# Patient Record
Sex: FEMALE | Race: WHITE | NOT HISPANIC OR LATINO | Employment: FULL TIME | URBAN - METROPOLITAN AREA
[De-identification: names, ages, dates, MRNs, and addresses within clinical notes are randomized per-mention and may not be internally consistent; named-entity substitution may affect disease eponyms.]

---

## 2024-01-24 PROBLEM — Z01.419 WELL WOMAN EXAM: Status: ACTIVE | Noted: 2024-01-24

## 2024-01-24 NOTE — PROGRESS NOTES
32 y/o here for follow up- 2nd opinion on fibroids- was seen at [facility] for colposcopy in 11/2023 for abnormal pap smear done by PCP in 10/2023.  Biopsy attempted but unsuccessful due to position of fibroids.    Gynecologist suggested myomectomy vs hysterectomy vs uterine arterine mbolism.  Pt understands she is seeing cnm and will be rescheduled with gyn      Refer to Dr. Rupal Maguire, JOSE-ERENDIRA

## 2024-01-30 ENCOUNTER — OFFICE VISIT (OUTPATIENT)
Dept: OBSTETRICS AND GYNECOLOGY | Facility: CLINIC | Age: 34
End: 2024-01-30
Payer: COMMERCIAL

## 2024-01-30 VITALS
SYSTOLIC BLOOD PRESSURE: 124 MMHG | DIASTOLIC BLOOD PRESSURE: 80 MMHG | BODY MASS INDEX: 50.05 KG/M2 | WEIGHT: 272 LBS | HEIGHT: 62 IN

## 2024-01-30 DIAGNOSIS — D21.9 FIBROIDS: Primary | ICD-10-CM

## 2024-01-30 PROCEDURE — 1036F TOBACCO NON-USER: CPT | Performed by: MIDWIFE

## 2024-01-30 PROCEDURE — 99202 OFFICE O/P NEW SF 15 MIN: CPT | Performed by: MIDWIFE

## 2024-01-30 RX ORDER — CETIRIZINE HYDROCHLORIDE 10 MG/1
TABLET ORAL
COMMUNITY

## 2024-01-30 RX ORDER — ESCITALOPRAM OXALATE 10 MG/1
10 TABLET ORAL DAILY
COMMUNITY

## 2024-01-30 RX ORDER — NORETHINDRONE ACETATE AND ETHINYL ESTRADIOL, AND FERROUS FUMARATE 1.5-30(21)
1 KIT ORAL DAILY
COMMUNITY
End: 2024-05-10 | Stop reason: WASHOUT

## 2024-02-22 ENCOUNTER — OFFICE VISIT (OUTPATIENT)
Dept: OBSTETRICS AND GYNECOLOGY | Facility: CLINIC | Age: 34
End: 2024-02-22
Payer: COMMERCIAL

## 2024-02-22 VITALS
BODY MASS INDEX: 49.5 KG/M2 | HEIGHT: 62 IN | DIASTOLIC BLOOD PRESSURE: 88 MMHG | SYSTOLIC BLOOD PRESSURE: 145 MMHG | HEART RATE: 85 BPM | WEIGHT: 269 LBS

## 2024-02-22 DIAGNOSIS — D25.9 UTERINE LEIOMYOMA, UNSPECIFIED LOCATION: Primary | ICD-10-CM

## 2024-02-22 DIAGNOSIS — N93.9 ABNORMAL UTERINE BLEEDING (AUB): ICD-10-CM

## 2024-02-22 PROCEDURE — 1036F TOBACCO NON-USER: CPT | Performed by: STUDENT IN AN ORGANIZED HEALTH CARE EDUCATION/TRAINING PROGRAM

## 2024-02-22 PROCEDURE — 99215 OFFICE O/P EST HI 40 MIN: CPT | Performed by: STUDENT IN AN ORGANIZED HEALTH CARE EDUCATION/TRAINING PROGRAM

## 2024-02-22 ASSESSMENT — ENCOUNTER SYMPTOMS
RESPIRATORY NEGATIVE: 0
ALLERGIC/IMMUNOLOGIC NEGATIVE: 0
CONSTITUTIONAL NEGATIVE: 0
HEMATOLOGIC/LYMPHATIC NEGATIVE: 0
PSYCHIATRIC NEGATIVE: 0
MUSCULOSKELETAL NEGATIVE: 0
NEUROLOGICAL NEGATIVE: 0
CARDIOVASCULAR NEGATIVE: 0
EYES NEGATIVE: 0
ENDOCRINE NEGATIVE: 0
GASTROINTESTINAL NEGATIVE: 0

## 2024-02-22 ASSESSMENT — PAIN SCALES - GENERAL: PAINLEVEL: 0-NO PAIN

## 2024-02-22 NOTE — PROGRESS NOTES
"Division of Minimally Invasive Gynecologic Surgery  [facility]    02/22/24 Gynecology Consult     HISTORY OF PRESENT ILLNESS:  Khadijah Shaw 33 y.o. referred by ERENDIRA Maguire for AUB/fibroids.     She has no interest in fertility and has known that for several years.     Periods have always been heavy and painful. She also reports progressively worsening urinary urgency. She also has low back pain. She has had these symptoms for several years now.     Could not complete recent colposcopy due to cervical displacement from fibroids.     Imaging:   - Outside US 11/2023 showed uterus measuring 22cm x 11cm x 13 cm, multiple fibroids.   Labs: Recent CBC and TSH at OSH reviewed via patient's phone, WNL   Screening:   - Last pap 2023 negative/HPV other+, no hx of CIN2-3  PMHx: None   PSHx: foot surgery, arm surgery as a child     PAST MEDICAL HISTORY:  Past Medical History:   Diagnosis Date    Other specified health status     No pertinent past medical history       PAST SURGICAL HISTORY:  Past Surgical History:   Procedure Laterality Date    OTHER SURGICAL HISTORY  10/05/2020    Dermatological surgery       PHYSICAL EXAMINATION:  VITAL SIGNS: /88   Pulse 85   Ht 1.575 m (5' 2\")   Wt 122 kg (269 lb)   LMP 02/20/2024   BMI 49.20 kg/m²      Constitutional:  No acute distress, well-nourished and well-developed  HEENT: EOM grossly intact, MMM, neck supple and with full ROM  Pulm:  Effort normal. No accessory muscle usage.  No respiratory distress.  Neurological:  She is alert and oriented to person place and time.  Skin: Warm, no pallor.  Psychiatric:  She has normal mood and affect.    ASSESSMENT:  Khadijah Shaw 33 y.o. referred by ERENDIRA Maguire for AUB/fibroids.     We discussed the etiology and natural history of fibroids today, including the fact that their growth is estrogen-dependent and fibroids typically decrease in size after menopause. We reviewed treatment " options, including medical suppression of bleeding and surgical interventions, including UAE, myomectomy, and hysterectomy.     We discussed the standard procedure for laparoscopic hysterectomy, including contained morcellation. We reviewed the risks/benefits/alternatives to surgery. She is aware of the risk of bleeding, infection, and damage to nearby structures, including bladder, ureters, bowel, and vasculature. She is agreeable to blood transfusion if recommended. We discussed the risks/benefits of contained morcellation vs laparotomy, and she is comfortable with proceeding with contained morcellation. We reviewed the small risk of laparotomy and the fact that fertility following hysterectomy is not an option.     We reviewed the option of Lupron, both for menstrual suppression and for decrease in uterine volume prior to surgery. She is considering.       PLAN:  - Schedule for:   - Exam under anesthesia, colposcopy, operative hysteroscopy with endometrial sampling. PAT: No. Any location.    - Total laparoscopic hysterectomy, bilateral salpingectomy, any indicated procedure. PAT: Yes. Any location. Will defer scheduling until MRI is completed.   - MRI pelvis     Margaret Nunn MD  02/22/24  11:43 AM

## 2024-02-26 ENCOUNTER — PREP FOR PROCEDURE (OUTPATIENT)
Dept: OBSTETRICS AND GYNECOLOGY | Facility: HOSPITAL | Age: 34
End: 2024-02-26
Payer: COMMERCIAL

## 2024-02-26 DIAGNOSIS — N93.9 ABNORMAL UTERINE BLEEDING (AUB): Primary | ICD-10-CM

## 2024-02-29 DIAGNOSIS — N93.9 ABNORMAL UTERINE BLEEDING (AUB): ICD-10-CM

## 2024-03-04 DIAGNOSIS — Z01.818 PREOP EXAMINATION: Primary | ICD-10-CM

## 2024-03-04 PROBLEM — N93.9 ABNORMAL UTERINE BLEEDING (AUB): Status: ACTIVE | Noted: 2024-02-26

## 2024-03-11 ENCOUNTER — HOSPITAL ENCOUNTER (OUTPATIENT)
Dept: RADIOLOGY | Facility: CLINIC | Age: 34
Discharge: HOME | End: 2024-03-11
Payer: COMMERCIAL

## 2024-03-11 DIAGNOSIS — D25.9 UTERINE LEIOMYOMA, UNSPECIFIED LOCATION: ICD-10-CM

## 2024-03-11 PROCEDURE — A9575 INJ GADOTERATE MEGLUMI 0.1ML: HCPCS | Performed by: STUDENT IN AN ORGANIZED HEALTH CARE EDUCATION/TRAINING PROGRAM

## 2024-03-11 PROCEDURE — 2550000001 HC RX 255 CONTRASTS: Performed by: STUDENT IN AN ORGANIZED HEALTH CARE EDUCATION/TRAINING PROGRAM

## 2024-03-11 PROCEDURE — 72197 MRI PELVIS W/O & W/DYE: CPT

## 2024-03-11 PROCEDURE — 72197 MRI PELVIS W/O & W/DYE: CPT | Performed by: RADIOLOGY

## 2024-03-11 RX ORDER — GADOTERATE MEGLUMINE 376.9 MG/ML
20 INJECTION INTRAVENOUS
Status: COMPLETED | OUTPATIENT
Start: 2024-03-11 | End: 2024-03-11

## 2024-03-11 RX ADMIN — GADOTERATE MEGLUMINE 20 ML: 376.9 INJECTION INTRAVENOUS at 16:00

## 2024-03-15 ENCOUNTER — PRE-ADMISSION TESTING (OUTPATIENT)
Dept: PREADMISSION TESTING | Facility: HOSPITAL | Age: 34
End: 2024-03-15
Payer: COMMERCIAL

## 2024-03-15 VITALS
DIASTOLIC BLOOD PRESSURE: 84 MMHG | TEMPERATURE: 98.5 F | HEIGHT: 62 IN | SYSTOLIC BLOOD PRESSURE: 130 MMHG | WEIGHT: 272.05 LBS | HEART RATE: 70 BPM | OXYGEN SATURATION: 97 % | BODY MASS INDEX: 50.06 KG/M2

## 2024-03-15 DIAGNOSIS — N93.9 ABNORMAL UTERINE BLEEDING (AUB): ICD-10-CM

## 2024-03-15 DIAGNOSIS — Z01.818 PREOP EXAMINATION: ICD-10-CM

## 2024-03-15 LAB
ANION GAP SERPL CALC-SCNC: 15 MMOL/L (ref 10–20)
BUN SERPL-MCNC: 9 MG/DL (ref 6–23)
CALCIUM SERPL-MCNC: 9 MG/DL (ref 8.6–10.6)
CHLORIDE SERPL-SCNC: 103 MMOL/L (ref 98–107)
CO2 SERPL-SCNC: 23 MMOL/L (ref 21–32)
CREAT SERPL-MCNC: 0.64 MG/DL (ref 0.5–1.05)
EGFRCR SERPLBLD CKD-EPI 2021: >90 ML/MIN/1.73M*2
ERYTHROCYTE [DISTWIDTH] IN BLOOD BY AUTOMATED COUNT: 14 % (ref 11.5–14.5)
GLUCOSE SERPL-MCNC: 82 MG/DL (ref 74–99)
HCT VFR BLD AUTO: 40.2 % (ref 36–46)
HGB BLD-MCNC: 13 G/DL (ref 12–16)
MCH RBC QN AUTO: 28 PG (ref 26–34)
MCHC RBC AUTO-ENTMCNC: 32.3 G/DL (ref 32–36)
MCV RBC AUTO: 87 FL (ref 80–100)
NRBC BLD-RTO: 0 /100 WBCS (ref 0–0)
PLATELET # BLD AUTO: 277 X10*3/UL (ref 150–450)
POTASSIUM SERPL-SCNC: 4.3 MMOL/L (ref 3.5–5.3)
RBC # BLD AUTO: 4.65 X10*6/UL (ref 4–5.2)
SODIUM SERPL-SCNC: 137 MMOL/L (ref 136–145)
WBC # BLD AUTO: 7.8 X10*3/UL (ref 4.4–11.3)

## 2024-03-15 PROCEDURE — 80048 BASIC METABOLIC PNL TOTAL CA: CPT

## 2024-03-15 PROCEDURE — 99204 OFFICE O/P NEW MOD 45 MIN: CPT | Performed by: NURSE PRACTITIONER

## 2024-03-15 PROCEDURE — 36415 COLL VENOUS BLD VENIPUNCTURE: CPT

## 2024-03-15 PROCEDURE — 85027 COMPLETE CBC AUTOMATED: CPT

## 2024-03-15 ASSESSMENT — ENCOUNTER SYMPTOMS
MUSCULOSKELETAL NEGATIVE: 1
EYES NEGATIVE: 1
ENDOCRINE NEGATIVE: 1
NECK NEGATIVE: 1
CARDIOVASCULAR NEGATIVE: 1
NEUROLOGICAL NEGATIVE: 1
GASTROINTESTINAL NEGATIVE: 1
RESPIRATORY NEGATIVE: 1
CONSTITUTIONAL NEGATIVE: 1

## 2024-03-15 ASSESSMENT — DUKE ACTIVITY SCORE INDEX (DASI)
DASI METS SCORE: 9.9
CAN YOU DO HEAVY WORK AROUND THE HOUSE LIKE SCRUBBING FLOORS OR LIFTING AND MOVING HEAVY FURNITURE: YES
CAN YOU DO YARD WORK LIKE RAKING LEAVES, WEEDING OR PUSHING A MOWER: YES
CAN YOU PARTICIPATE IN STRENOUS SPORTS LIKE SWIMMING, SINGLES TENNIS, FOOTBALL, BASKETBALL, OR SKIING: YES
CAN YOU TAKE CARE OF YOURSELF (EAT, DRESS, BATHE, OR USE TOILET): YES
CAN YOU DO LIGHT WORK AROUND THE HOUSE LIKE DUSTING OR WASHING DISHES: YES
CAN YOU WALK INDOORS, SUCH AS AROUND YOUR HOUSE: YES
CAN YOU WALK A BLOCK OR TWO ON LEVEL GROUND: YES
CAN YOU CLIMB A FLIGHT OF STAIRS OR WALK UP A HILL: YES
CAN YOU HAVE SEXUAL RELATIONS: YES
CAN YOU PARTICIPATE IN MODERATE RECREATIONAL ACTIVITIES LIKE GOLF, BOWLING, DANCING, DOUBLES TENNIS OR THROWING A BASEBALL OR FOOTBALL: YES
CAN YOU DO MODERATE WORK AROUND THE HOUSE LIKE VACUUMING, SWEEPING FLOORS OR CARRYING GROCERIES: YES
CAN YOU RUN A SHORT DISTANCE: YES
TOTAL_SCORE: 58.2

## 2024-03-15 ASSESSMENT — CHADS2 SCORE
PRIOR STROKE OR TIA OR THROMBOEMBOLISM: NO
CHF: NO
CHADS2 SCORE: 0
AGE GREATER THAN OR EQUAL TO 75: NO
HYPERTENSION: NO
DIABETES: NO

## 2024-03-15 ASSESSMENT — LIFESTYLE VARIABLES: SMOKING_STATUS: NONSMOKER

## 2024-03-15 NOTE — CPM/PAT H&P
"CPM/PAT Evaluation       Name: Khadijah Shaw (Khadijah Shaw \"Yessica\")  /Age: 1990/ y.o.     Visit Type:   In-Person       Chief Complaint: AUB/fibroids    HPI  The patient is a 33 year old female with complaints of menorrhagia, fibroids. She notes low back pain and worsening urinary urgency. She had recent ultrasound 2023 with uterus measuring 22cm x 11cm x 13 cm, multiple fibroids.  She presents today for perioperative evaluation in anticipation of Exam under anesthesia, colposcopy, operative hysteroscopy with endometrial sampling on 3/26/24 with Dr. Nunn.    Past Medical History:   Diagnosis Date    Anxiety     BMI 45.0-49.9, adult (CMS/[facility])     Depression     Dysfunctional uterine bleeding     Fractures     age 3    GERD (gastroesophageal reflux disease)     Leiomyoma     uterine    Rosacea     Uterine leiomyoma     Vitamin D deficiency     Vitamin D deficiency        Past Surgical History:   Procedure Laterality Date    FOOT SURGERY      wart removal    FRACTURE SURGERY      arm as child       Patient  has no history on file for sexual activity.    Family History   Problem Relation Name Age of Onset    Hypertension Mother      Breast cancer Mother      Thyroid cancer Mother      Hypertension Father      Skin cancer Father      Hypertension Brother         Allergies   Allergen Reactions    Animal Dander Runny nose     sneezing    Bee Pollen Hives       Prior to Admission medications    Medication Sig Start Date End Date Taking? Authorizing Provider   cetirizine (ZyrTEC) 10 mg tablet Take by mouth.    Historical Provider, MD   escitalopram (Lexapro) 10 mg tablet Take 1 tablet (10 mg) by mouth once daily.    Historical Provider, MD   Junel FE 1., 28, 1.5 mg-30 mcg (21)/75 mg (7) tablet Take 1 tablet by mouth once daily.    Historical Provider, MD   leuprolide, 3-month, (Lupron Depot) 11.25 mg injection Inject 11.25 mg into the muscle every 3 months. 3/4/24 3/4/25  Margaret WEBB" MD Edouard        PAT ROS:   Constitutional:   neg    Neuro/Psych:   neg    Eyes:   neg    Ears:   neg    Nose:   neg    Mouth:   neg    Throat:   neg    Neck:   neg    Cardio:   neg    Respiratory:   neg    Endocrine:   neg    GI:   neg    :   neg    Musculoskeletal:   neg    Hematologic:   neg    Skin:  neg        Physical Exam  Vitals reviewed.   Constitutional:       Appearance: Normal appearance. She is obese.   HENT:      Head: Normocephalic and atraumatic.      Nose: Nose normal.      Mouth/Throat:      Mouth: Mucous membranes are moist.      Pharynx: Oropharynx is clear.   Eyes:      Extraocular Movements: Extraocular movements intact.      Conjunctiva/sclera: Conjunctivae normal.      Pupils: Pupils are equal, round, and reactive to light.   Cardiovascular:      Rate and Rhythm: Normal rate and regular rhythm.      Pulses: Normal pulses.      Heart sounds: Normal heart sounds.   Pulmonary:      Effort: Pulmonary effort is normal.      Breath sounds: Normal breath sounds.   Musculoskeletal:         General: Normal range of motion.      Cervical back: Normal range of motion.   Skin:     General: Skin is warm and dry.   Neurological:      General: No focal deficit present.      Mental Status: She is alert and oriented to person, place, and time.   Psychiatric:         Mood and Affect: Mood normal.         Behavior: Behavior normal.          PAT AIRWAY:   Airway:     Mallampati::  IV    TM distance::  >3 FB    Neck ROM::  Full  normal        Visit Vitals  /84   Pulse 70   Temp 36.9 °C (98.5 °F) (Oral)       DASI Risk Score      Flowsheet Row Most Recent Value   DASI SCORE 58.2   METS Score (Will be calculated only when all the questions are answered) 9.9          Caprini DVT Assessment      Flowsheet Row Most Recent Value   DVT Score 5   Current Status Minor surgery planned   Women Oral contraceptives   Age Less than 40 years   BMI 41-50 (Morbid obesity)          Modified Frailty Index      Flowsheet  Row Most Recent Value   Modified Frailty Index Calculator 0          CHADS2 Stroke Risk  Current as of just now        N/A 3 - 100%: High Risk   2 - 3%: Medium Risk   0 - 2%: Low Risk     Last Change: N/A          This score determines the patient's risk of having a stroke if the patient has atrial fibrillation.        This score is not applicable to this patient. Components are not calculated.          Revised Cardiac Risk Index      Flowsheet Row Most Recent Value   Revised Cardiac Risk Calculator 0          Apfel Simplified Score      Flowsheet Row Most Recent Value   Apfel Simplified Score Calculator 3          Risk Analysis Index Results This Encounter    No data found in the last 1 encounters.       Stop Bang Score      Flowsheet Row Most Recent Value   Do you snore loudly? 0   Do you often feel tired or fatigued after your sleep? 0   Has anyone ever observed you stop breathing in your sleep? 0   Do you have or are you being treated for high blood pressure? 0   Recent BMI (Calculated) 49.2   Is BMI greater than 35 kg/m2? 1=Yes   Age older than 50 years old? 0=No   Is your neck circumference greater than 17 inches (Male) or 16 inches (Female)? 0   Gender - Male 0=No   STOP-BANG Total Score 1          Recent Results (from the past 336 hour(s))   CBC    Collection Time: 03/15/24  8:08 AM   Result Value Ref Range    WBC 7.8 4.4 - 11.3 x10*3/uL    nRBC 0.0 0.0 - 0.0 /100 WBCs    RBC 4.65 4.00 - 5.20 x10*6/uL    Hemoglobin 13.0 12.0 - 16.0 g/dL    Hematocrit 40.2 36.0 - 46.0 %    MCV 87 80 - 100 fL    MCH 28.0 26.0 - 34.0 pg    MCHC 32.3 32.0 - 36.0 g/dL    RDW 14.0 11.5 - 14.5 %    Platelets 277 150 - 450 x10*3/uL   Basic Metabolic Panel    Collection Time: 03/15/24  8:08 AM   Result Value Ref Range    Glucose 82 74 - 99 mg/dL    Sodium 137 136 - 145 mmol/L    Potassium 4.3 3.5 - 5.3 mmol/L    Chloride 103 98 - 107 mmol/L    Bicarbonate 23 21 - 32 mmol/L    Anion Gap 15 10 - 20 mmol/L    Urea Nitrogen 9 6 - 23  mg/dL    Creatinine 0.64 0.50 - 1.05 mg/dL    eGFR >90 >60 mL/min/1.73m*2    Calcium 9.0 8.6 - 10.6 mg/dL        Diagnostic Results        Assessment and Plan:     Anesthesia:  The patient denies problems with anesthesia in the past such as PONV, prolonged sedation, awareness, dental damage, aspiration, cardiac arrest, difficult intubation, or unexpected hospital admissions.     Neuro:   The patient has history of anxiety and depression controlled on oral medication. No grossly apparent perioperative risk. The patient is at increased risk for perioperative stroke secondary to female gender. Handouts for preoperative brain exercises given to patient.    HEENT/Airway  The patient has diagnoses, significant findings on chart review, clinical presentation or evaluation of obesity, short thick neck. No documented or reported history of airway difficulty.     Cardiovascular  The patient is scheduled for a low risk procedure.  Therefore, no further cardiac evaluation is indicated.  RCRI  The patient meets 0-1 RCRI criteria and therefore has a less than 1% risk of major adverse cardiac complications.  METS  The patient's functional capacity capacity is greater than 4 METS.  Heart Failure  The patient has no known history of heart failure.  Additionally, the patient reports no symptoms of heart failure and demonstrates no signs of heart failure.  Hypertension Evaluation  The patient has no known history of hypertension and has a normal blood pressure today.  Heart Rhythm Evaluation  The patient has no history of arrhythmias.  Heart Valve Evaluation  The patient has no known history of valvular heart disease. The patient has no symptoms or physical exam findings to suggest valvular heart disease.  CARDS EVAL  The patient is not followed by cardiology.    The patient has a 30-day risk for MACE of 0 predictors, 3.9% risk for cardiac death, nonfatal myocardial infarction, and nonfatal cardiac arrest.  FILEMON score which indicates  a 0% risk of intraoperative or 30-day postoperative.    Pulmonary   No significant findings on chart review or clinical presentation and evaluation. The patient is at increased risk of perioperative pulmonary complications secondary to morbid obesity.    The patient has a stop bang score of 1, which places patient at low risk for having WES.    ARISCAT 0, low, 1.6% risk of in-hospital postoperative pulmonary complications  PRODIGY 3, low risk of respiratory depression episode. Patient given PI sheet for preoperative deep breathing exercises.    Hematology  No diagnoses or significant findings on chart review or clinical presentation and evaluation.  Antiplatelet management   The patient is not currently receiving antiplatelet therapy.  Anticoagulation management  The patient is not currently receiving anticoagulation therapy. Patient provided with DVT educational handout.      Caprini score 5, high risk of perioperative VTE.   Patient instructed to ambulate as soon as possible postoperatively to decrease thromboembolic risk. Initiate mechanical DVT prophylaxis as soon as possible and initiate chemical prophylaxis when deemed safe from a bleeding standpoint post surgery.     Gastrointestinal  The patient has diagnoses or significant findings on chart review or clinical presentation and evaluation significant for GERD managed on OTC.  Eat 10- 0,  self-perceived oropharyngeal dysphagia scale (0-40)     Genitourinary  No diagnoses or significant findings on chart review or clinical presentation and evaluation.    Renal  The patient has no known history of chronic kidney disease. No renal diagnoses or significant findings on chart review or clinical presentation and evaluation..     Musculoskeletal  No diagnoses or significant findings on chart review or clinical presentation and evaluation.    Endocrine  Diabetes Evaluation  The patient has no history of diabetes mellitus.  Thyroid Disease Evaluation  The patient has no  history of thyroid disease.    ID  No diagnoses or significant findings on chart review or clinical presentation and evaluation.    -Preoperative medication instructions were provided and reviewed with the patient.  Any additional testing or evaluation was explained to the patient.  NPO Instructions were discussed, and the patient's questions were answered prior to conclusion of this encounter.

## 2024-03-15 NOTE — PREPROCEDURE INSTRUCTIONS
Fasting Guidelines    NPO Instructions:    Do not eat any food after midnight the night before your surgery/procedure.  You may have up to TEN ounces of clear liquids until TWO hours before your instructed arrival time to the hospital. This includes water, black tea/coffee, (no milk or cream), apple juice, and/or electrolyte drinks (Gatorade).  You may chew gum up to TWO hours before your surgery/procedure.    Additional Instructions:    Avoid herbal supplements, multivitamins and NSAIDS (non-steroidal anti-inflammatory drugs) such as Advil, Aleve, Ibuprofen, Naproxen, Excedrin, Meloxicam or Celebrex for at least 7 days prior to surgery. May take Tylenol as needed.    Avoid tobacco and alcohol products for 24 hours prior to surgery.    CONTACT SURGEON'S OFFICE IF YOU DEVELOP:  * Fever = 100.4 F   * New respiratory symptoms (e.g. cough, shortness of breath, respiratory distress, sore throat)  * Recent loss of taste or smell  *Flu like symptoms such as headache, fatigue or gastrointestinal symptoms  * You develop any open sores, shingles, burning or painful urination   AND/OR:  * You no longer wish to have the surgery.  * Any other personal circumstances change that may lead to the need to cancel or defer this surgery.  *You were admitted to any hospital within one week of your planned procedure.    Seven/Six Days before Surgery:  Review your medication instructions, stop indicated medications    Day of Surgery:  Review your medication instructions, take indicated medications  Wear comfortable loose fitting clothing  Do not use moisturizers, creams, lotions or perfume  All jewelry and valuables should be left at home    Krish Zapata [facility]  [facility]  Mvwpd-[phone]  [phone]  Email-[email or web address]    [facility]  [phone]       Preoperative Brain Exercises    What are brain exercises?  A brain exercise is any activity that  engages your thinking (cognitive) skills.    What types of activities are considered brain exercises?  Jigsaw puzzles, crossword puzzles, word jumble, memory games, word search, and many more.  Many can be found free online or on your phone via a mobile tee.    Why should I do brain exercises before my surgery?  More recent research has shown brain exercise before surgery can lower the risk of postoperative delirium (confusion) which can be especially important for older adults.  Patients who did brain exercises for 5 to 10 hours the days before surgery, cut their risk of postoperative delirium in half up to 1 week after surgery.         [facility]    Preoperative Deep Breathing Exercises    Why it is important to do deep breathing exercises before my surgery?  Deep breathing exercises strengthen your breathing muscles.  This helps you to recover after your surgery and decreases the chance of breathing complications.      How are the deep breathing exercises done?  Sit straight with your back supported.  Breathe in deeply and slowly through your nose. Your lower rib cage should expand and your abdomen may move forward.  Hold that breath for 3 to 5 seconds.  Breathe out through pursed lips, slowly and completely.  Rest and repeat 10 times every hour while awake.  Rest longer if you become dizzy or lightheaded.         Patient and Family Education             Ways You Can Help Prevent Blood Clots             This handout explains some simple things you can do to help prevent blood clots.      Blood clots are blockages that can form in the body's veins. When a blood clot forms in your deep veins, it may be called a deep vein thrombosis, or DVT for short. Blood clots can happen in any part of the body where blood flows, but they are most common in the arms and legs. If a piece of a blood clot breaks free and travels to the lungs, it is called a pulmonary embolus (PE). A PE can be a very  serious problem.         Being in the hospital or having surgery can raise your chances of getting a blood clot because you may not be well enough to move around as much as you normally do.         Ways you can help prevent blood clots in the hospital         Wearing SCDs. SCDs stands for Sequential Compression Devices.   SCDs are special sleeves that wrap around your legs  They attach to a pump that fills them with air to gently squeeze your legs every few minutes.   This helps return the blood in your legs to your heart.   SCDs should only be taken off when walking or bathing.   SCDs may not be comfortable, but they can help save your life.               Wearing compression stockings - if your doctor orders them. These special snug fitting stockings gently squeeze your legs to help blood flow.       Walking. Walking helps move the blood in your legs.   If your doctor says it is ok, try walking the halls at least   5 times a day. Ask us to help you get up, so you don't fall.      Taking any blood thinning medicines your doctor orders.        Page 1 of 2     [facility]; 3/23   Ways you can help prevent blood clots at home       Wearing compression stockings - if your doctor orders them. ? Walking - to help move the blood in your legs.       Taking any blood thinning medicines your doctor orders.      Signs of a blood clot or PE      Tell your doctor or nurse know right away if you have of the problems listed below.    If you are at home, seek medical care right away. Call 911 for chest pain or problems breathing.               Signs of a blood clot (DVT) - such as pain,  swelling, redness or warmth in your arm or leg      Signs of a pulmonary embolism (PE) - such as chest     pain or feeling short of breath

## 2024-03-20 DIAGNOSIS — D21.9 FIBROIDS: Primary | ICD-10-CM

## 2024-03-20 RX ORDER — LEUPROLIDE ACETATE 11.25 MG
11.25 KIT INTRAMUSCULAR
Qty: 1 KIT | Refills: 1 | Status: SHIPPED | OUTPATIENT
Start: 2024-03-20 | End: 2024-05-01 | Stop reason: WASHOUT

## 2024-03-22 ENCOUNTER — TELEPHONE (OUTPATIENT)
Dept: OBSTETRICS AND GYNECOLOGY | Facility: CLINIC | Age: 34
End: 2024-03-22
Payer: COMMERCIAL

## 2024-03-22 ENCOUNTER — SPECIALTY PHARMACY (OUTPATIENT)
Dept: PHARMACY | Facility: CLINIC | Age: 34
End: 2024-03-22

## 2024-03-25 ENCOUNTER — ANESTHESIA EVENT (OUTPATIENT)
Dept: OPERATING ROOM | Facility: HOSPITAL | Age: 34
End: 2024-03-25
Payer: COMMERCIAL

## 2024-03-25 NOTE — H&P
History Of Present Illness  Yessica Shaw is a 33 y.o. female presenting with AUB who presents for exam under anesthesia, colposcopy, diagnostic hysteroscopy, endometrial sampling.      Periods have always been heavy and painful. She also reports progressively worsening urinary urgency. She also has low back pain. She has had these symptoms for several years now.      Could not complete recent colposcopy due to cervical displacement from fibroids.      Imaging:   - Outside US 11/2023 showed uterus measuring 22cm x 11cm x 13 cm, multiple fibroids.   Screening:   - Last pap 2023 negative/HPV other+, no hx of CIN2-3  PMHx: None   PSHx: foot surgery, arm surgery as a child      Past Medical History  Past Medical History:   Diagnosis Date    Anxiety     BMI 45.0-49.9, adult (CMS/[facility])     Depression     Dysfunctional uterine bleeding     Fractures     age 3    GERD (gastroesophageal reflux disease)     Leiomyoma     uterine    Rosacea     Uterine leiomyoma     Vitamin D deficiency     Vitamin D deficiency        Surgical History  Past Surgical History:   Procedure Laterality Date    FOOT SURGERY      wart removal    FRACTURE SURGERY      arm as child        Social History  She reports that she has never smoked. She has never used smokeless tobacco. She reports current alcohol use. She reports that she does not use drugs.    Family History  Family History   Problem Relation Name Age of Onset    Hypertension Mother      Breast cancer Mother      Thyroid cancer Mother      Hypertension Father      Skin cancer Father      Hypertension Brother          Allergies  Animal dander and Bee pollen    Review of Systems   All other systems reviewed and are negative.       Physical Exam  Constitutional:       General: She is not in acute distress.     Appearance: Normal appearance.   HENT:      Head: Normocephalic and atraumatic.      Nose: Nose normal.   Eyes:      General: No scleral icterus.  Cardiovascular:      Rate and  Rhythm: Normal rate.   Pulmonary:      Effort: Pulmonary effort is normal.   Abdominal:      Palpations: Abdomen is soft.   Musculoskeletal:         General: Normal range of motion.      Cervical back: Normal range of motion.   Skin:     General: Skin is warm and dry.   Neurological:      General: No focal deficit present.      Mental Status: She is alert and oriented to person, place, and time.   Psychiatric:         Mood and Affect: Mood normal.         Behavior: Behavior normal.          Last Recorded Vitals  There were no vitals taken for this visit.       Assessment/Plan   Principal Problem:    Abnormal uterine bleeding (AUB)    Yessica Shaw is a 33 y.o. female presenting with AUB who presents for exam under anesthesia, colposcopy, diagnostic hysteroscopy, endometrial sampling.     Discussed and seen with Dr. Edouard Wooten MD    Interval update: Pt seen and consented. Proceed w/ surgery as scheduled.

## 2024-03-26 ENCOUNTER — HOSPITAL ENCOUNTER (OUTPATIENT)
Facility: HOSPITAL | Age: 34
Setting detail: OUTPATIENT SURGERY
Discharge: HOME | End: 2024-03-26
Attending: STUDENT IN AN ORGANIZED HEALTH CARE EDUCATION/TRAINING PROGRAM | Admitting: STUDENT IN AN ORGANIZED HEALTH CARE EDUCATION/TRAINING PROGRAM
Payer: COMMERCIAL

## 2024-03-26 ENCOUNTER — ANESTHESIA (OUTPATIENT)
Dept: OPERATING ROOM | Facility: HOSPITAL | Age: 34
End: 2024-03-26
Payer: COMMERCIAL

## 2024-03-26 VITALS
RESPIRATION RATE: 17 BRPM | TEMPERATURE: 98.1 F | SYSTOLIC BLOOD PRESSURE: 124 MMHG | DIASTOLIC BLOOD PRESSURE: 73 MMHG | WEIGHT: 272.27 LBS | BODY MASS INDEX: 51.4 KG/M2 | HEIGHT: 61 IN | HEART RATE: 85 BPM | OXYGEN SATURATION: 95 %

## 2024-03-26 DIAGNOSIS — N93.9 ABNORMAL UTERINE BLEEDING (AUB): Primary | ICD-10-CM

## 2024-03-26 LAB — PREGNANCY TEST URINE, POC: NEGATIVE

## 2024-03-26 PROCEDURE — 3600000008 HC OR TIME - EACH INCREMENTAL 1 MINUTE - PROCEDURE LEVEL THREE: Performed by: STUDENT IN AN ORGANIZED HEALTH CARE EDUCATION/TRAINING PROGRAM

## 2024-03-26 PROCEDURE — 88305 TISSUE EXAM BY PATHOLOGIST: CPT | Mod: TC,SUR | Performed by: STUDENT IN AN ORGANIZED HEALTH CARE EDUCATION/TRAINING PROGRAM

## 2024-03-26 PROCEDURE — 3700000002 HC GENERAL ANESTHESIA TIME - EACH INCREMENTAL 1 MINUTE: Performed by: STUDENT IN AN ORGANIZED HEALTH CARE EDUCATION/TRAINING PROGRAM

## 2024-03-26 PROCEDURE — 2720000007 HC OR 272 NO HCPCS: Performed by: STUDENT IN AN ORGANIZED HEALTH CARE EDUCATION/TRAINING PROGRAM

## 2024-03-26 PROCEDURE — 7100000009 HC PHASE TWO TIME - INITIAL BASE CHARGE: Performed by: STUDENT IN AN ORGANIZED HEALTH CARE EDUCATION/TRAINING PROGRAM

## 2024-03-26 PROCEDURE — 7100000002 HC RECOVERY ROOM TIME - EACH INCREMENTAL 1 MINUTE: Performed by: STUDENT IN AN ORGANIZED HEALTH CARE EDUCATION/TRAINING PROGRAM

## 2024-03-26 PROCEDURE — 2500000001 HC RX 250 WO HCPCS SELF ADMINISTERED DRUGS (ALT 637 FOR MEDICARE OP): Performed by: STUDENT IN AN ORGANIZED HEALTH CARE EDUCATION/TRAINING PROGRAM

## 2024-03-26 PROCEDURE — 2500000004 HC RX 250 GENERAL PHARMACY W/ HCPCS (ALT 636 FOR OP/ED): Performed by: STUDENT IN AN ORGANIZED HEALTH CARE EDUCATION/TRAINING PROGRAM

## 2024-03-26 PROCEDURE — 58558 HYSTEROSCOPY BIOPSY: CPT | Performed by: STUDENT IN AN ORGANIZED HEALTH CARE EDUCATION/TRAINING PROGRAM

## 2024-03-26 PROCEDURE — 2500000005 HC RX 250 GENERAL PHARMACY W/O HCPCS

## 2024-03-26 PROCEDURE — 3600000003 HC OR TIME - INITIAL BASE CHARGE - PROCEDURE LEVEL THREE: Performed by: STUDENT IN AN ORGANIZED HEALTH CARE EDUCATION/TRAINING PROGRAM

## 2024-03-26 PROCEDURE — 2500000004 HC RX 250 GENERAL PHARMACY W/ HCPCS (ALT 636 FOR OP/ED): Performed by: ANESTHESIOLOGIST ASSISTANT

## 2024-03-26 PROCEDURE — 3700000001 HC GENERAL ANESTHESIA TIME - INITIAL BASE CHARGE: Performed by: STUDENT IN AN ORGANIZED HEALTH CARE EDUCATION/TRAINING PROGRAM

## 2024-03-26 PROCEDURE — 2500000005 HC RX 250 GENERAL PHARMACY W/O HCPCS: Performed by: ANESTHESIOLOGIST ASSISTANT

## 2024-03-26 PROCEDURE — A58558 PR HYSTEROSCOPY,W/ENDO BX: Performed by: STUDENT IN AN ORGANIZED HEALTH CARE EDUCATION/TRAINING PROGRAM

## 2024-03-26 PROCEDURE — 7100000001 HC RECOVERY ROOM TIME - INITIAL BASE CHARGE: Performed by: STUDENT IN AN ORGANIZED HEALTH CARE EDUCATION/TRAINING PROGRAM

## 2024-03-26 PROCEDURE — 2500000005 HC RX 250 GENERAL PHARMACY W/O HCPCS: Performed by: STUDENT IN AN ORGANIZED HEALTH CARE EDUCATION/TRAINING PROGRAM

## 2024-03-26 PROCEDURE — 2500000004 HC RX 250 GENERAL PHARMACY W/ HCPCS (ALT 636 FOR OP/ED)

## 2024-03-26 PROCEDURE — A4217 STERILE WATER/SALINE, 500 ML: HCPCS | Performed by: STUDENT IN AN ORGANIZED HEALTH CARE EDUCATION/TRAINING PROGRAM

## 2024-03-26 PROCEDURE — 7100000010 HC PHASE TWO TIME - EACH INCREMENTAL 1 MINUTE: Performed by: STUDENT IN AN ORGANIZED HEALTH CARE EDUCATION/TRAINING PROGRAM

## 2024-03-26 PROCEDURE — 81025 URINE PREGNANCY TEST: CPT | Performed by: ANESTHESIOLOGY

## 2024-03-26 PROCEDURE — 88305 TISSUE EXAM BY PATHOLOGIST: CPT | Performed by: STUDENT IN AN ORGANIZED HEALTH CARE EDUCATION/TRAINING PROGRAM

## 2024-03-26 RX ORDER — LABETALOL HYDROCHLORIDE 5 MG/ML
5 INJECTION, SOLUTION INTRAVENOUS ONCE AS NEEDED
Status: DISCONTINUED | OUTPATIENT
Start: 2024-03-26 | End: 2024-03-26 | Stop reason: HOSPADM

## 2024-03-26 RX ORDER — SODIUM CHLORIDE, SODIUM LACTATE, POTASSIUM CHLORIDE, CALCIUM CHLORIDE 600; 310; 30; 20 MG/100ML; MG/100ML; MG/100ML; MG/100ML
INJECTION, SOLUTION INTRAVENOUS CONTINUOUS PRN
Status: DISCONTINUED | OUTPATIENT
Start: 2024-03-26 | End: 2024-03-26

## 2024-03-26 RX ORDER — ONDANSETRON 4 MG/1
4 TABLET, FILM COATED ORAL EVERY 6 HOURS PRN
Qty: 20 TABLET | Refills: 0 | Status: SHIPPED | OUTPATIENT
Start: 2024-03-26 | End: 2024-04-02

## 2024-03-26 RX ORDER — GLYCOPYRROLATE 0.2 MG/ML
INJECTION INTRAMUSCULAR; INTRAVENOUS AS NEEDED
Status: DISCONTINUED | OUTPATIENT
Start: 2024-03-26 | End: 2024-03-26

## 2024-03-26 RX ORDER — PROPOFOL 10 MG/ML
INJECTION, EMULSION INTRAVENOUS AS NEEDED
Status: DISCONTINUED | OUTPATIENT
Start: 2024-03-26 | End: 2024-03-26

## 2024-03-26 RX ORDER — SODIUM CHLORIDE 0.9 G/100ML
IRRIGANT IRRIGATION AS NEEDED
Status: DISCONTINUED | OUTPATIENT
Start: 2024-03-26 | End: 2024-03-26 | Stop reason: HOSPADM

## 2024-03-26 RX ORDER — FENTANYL CITRATE 50 UG/ML
INJECTION, SOLUTION INTRAMUSCULAR; INTRAVENOUS AS NEEDED
Status: DISCONTINUED | OUTPATIENT
Start: 2024-03-26 | End: 2024-03-26

## 2024-03-26 RX ORDER — SODIUM CHLORIDE, SODIUM LACTATE, POTASSIUM CHLORIDE, CALCIUM CHLORIDE 600; 310; 30; 20 MG/100ML; MG/100ML; MG/100ML; MG/100ML
100 INJECTION, SOLUTION INTRAVENOUS CONTINUOUS
Status: DISCONTINUED | OUTPATIENT
Start: 2024-03-26 | End: 2024-03-26 | Stop reason: HOSPADM

## 2024-03-26 RX ORDER — ACETIC ACID 5 %
LIQUID (ML) MISCELLANEOUS CONTINUOUS PRN
Status: COMPLETED | OUTPATIENT
Start: 2024-03-26 | End: 2024-03-26

## 2024-03-26 RX ORDER — HYDROMORPHONE HYDROCHLORIDE 1 MG/ML
0.5 INJECTION, SOLUTION INTRAMUSCULAR; INTRAVENOUS; SUBCUTANEOUS EVERY 5 MIN PRN
Status: DISCONTINUED | OUTPATIENT
Start: 2024-03-26 | End: 2024-03-26 | Stop reason: HOSPADM

## 2024-03-26 RX ORDER — HYDROMORPHONE HYDROCHLORIDE 1 MG/ML
0.2 INJECTION, SOLUTION INTRAMUSCULAR; INTRAVENOUS; SUBCUTANEOUS EVERY 5 MIN PRN
Status: DISCONTINUED | OUTPATIENT
Start: 2024-03-26 | End: 2024-03-26 | Stop reason: HOSPADM

## 2024-03-26 RX ORDER — KETOROLAC TROMETHAMINE 30 MG/ML
INJECTION, SOLUTION INTRAMUSCULAR; INTRAVENOUS AS NEEDED
Status: DISCONTINUED | OUTPATIENT
Start: 2024-03-26 | End: 2024-03-26

## 2024-03-26 RX ORDER — ONDANSETRON HYDROCHLORIDE 2 MG/ML
4 INJECTION, SOLUTION INTRAVENOUS ONCE AS NEEDED
Status: COMPLETED | OUTPATIENT
Start: 2024-03-26 | End: 2024-03-26

## 2024-03-26 RX ORDER — ACETAMINOPHEN 325 MG/1
650 TABLET ORAL EVERY 4 HOURS PRN
Status: DISCONTINUED | OUTPATIENT
Start: 2024-03-26 | End: 2024-03-26 | Stop reason: HOSPADM

## 2024-03-26 RX ORDER — DROPERIDOL 2.5 MG/ML
0.62 INJECTION, SOLUTION INTRAMUSCULAR; INTRAVENOUS ONCE AS NEEDED
Status: DISCONTINUED | OUTPATIENT
Start: 2024-03-26 | End: 2024-03-26 | Stop reason: HOSPADM

## 2024-03-26 RX ORDER — ONDANSETRON HYDROCHLORIDE 2 MG/ML
INJECTION, SOLUTION INTRAVENOUS AS NEEDED
Status: DISCONTINUED | OUTPATIENT
Start: 2024-03-26 | End: 2024-03-26

## 2024-03-26 RX ORDER — IBUPROFEN 600 MG/1
600 TABLET ORAL EVERY 6 HOURS PRN
Qty: 20 TABLET | Refills: 0 | Status: SHIPPED | OUTPATIENT
Start: 2024-03-26 | End: 2024-04-05

## 2024-03-26 RX ORDER — LIDOCAINE HYDROCHLORIDE 10 MG/ML
0.1 INJECTION, SOLUTION EPIDURAL; INFILTRATION; INTRACAUDAL; PERINEURAL ONCE
Status: DISCONTINUED | OUTPATIENT
Start: 2024-03-26 | End: 2024-03-26 | Stop reason: HOSPADM

## 2024-03-26 RX ORDER — MIDAZOLAM HYDROCHLORIDE 1 MG/ML
INJECTION INTRAMUSCULAR; INTRAVENOUS AS NEEDED
Status: DISCONTINUED | OUTPATIENT
Start: 2024-03-26 | End: 2024-03-26

## 2024-03-26 RX ORDER — SUCCINYLCHOLINE CHLORIDE 20 MG/ML
INJECTION INTRAMUSCULAR; INTRAVENOUS AS NEEDED
Status: DISCONTINUED | OUTPATIENT
Start: 2024-03-26 | End: 2024-03-26

## 2024-03-26 RX ORDER — ROCURONIUM BROMIDE 10 MG/ML
INJECTION, SOLUTION INTRAVENOUS AS NEEDED
Status: DISCONTINUED | OUTPATIENT
Start: 2024-03-26 | End: 2024-03-26

## 2024-03-26 RX ORDER — LIDOCAINE HCL/PF 100 MG/5ML
SYRINGE (ML) INTRAVENOUS AS NEEDED
Status: DISCONTINUED | OUTPATIENT
Start: 2024-03-26 | End: 2024-03-26

## 2024-03-26 RX ORDER — ACETAMINOPHEN 325 MG/1
1000 TABLET ORAL EVERY 6 HOURS PRN
Qty: 20 TABLET | Refills: 0 | Status: SHIPPED | OUTPATIENT
Start: 2024-03-26 | End: 2024-04-05

## 2024-03-26 RX ADMIN — LIDOCAINE HYDROCHLORIDE 60 MG: 20 INJECTION INTRAVENOUS at 11:41

## 2024-03-26 RX ADMIN — GLYCOPYRROLATE 0.2 MG: 0.2 INJECTION, SOLUTION INTRAMUSCULAR; INTRAVENOUS at 11:56

## 2024-03-26 RX ADMIN — FENTANYL CITRATE 50 MCG: 50 INJECTION, SOLUTION INTRAMUSCULAR; INTRAVENOUS at 11:40

## 2024-03-26 RX ADMIN — ONDANSETRON 4 MG: 2 INJECTION INTRAMUSCULAR; INTRAVENOUS at 13:36

## 2024-03-26 RX ADMIN — Medication: at 12:50

## 2024-03-26 RX ADMIN — PROPOFOL 200 MG: 10 INJECTION, EMULSION INTRAVENOUS at 11:41

## 2024-03-26 RX ADMIN — FENTANYL CITRATE 50 MCG: 50 INJECTION, SOLUTION INTRAMUSCULAR; INTRAVENOUS at 11:47

## 2024-03-26 RX ADMIN — DEXAMETHASONE SODIUM PHOSPHATE 4 MG: 4 INJECTION INTRA-ARTICULAR; INTRALESIONAL; INTRAMUSCULAR; INTRAVENOUS; SOFT TISSUE at 12:13

## 2024-03-26 RX ADMIN — KETOROLAC TROMETHAMINE 30 MG: 30 INJECTION, SOLUTION INTRAMUSCULAR; INTRAVENOUS at 12:32

## 2024-03-26 RX ADMIN — ONDANSETRON 4 MG: 2 INJECTION INTRAMUSCULAR; INTRAVENOUS at 12:13

## 2024-03-26 RX ADMIN — KETOROLAC TROMETHAMINE 15 MG: 30 INJECTION, SOLUTION INTRAMUSCULAR; INTRAVENOUS at 12:14

## 2024-03-26 RX ADMIN — SUGAMMADEX 200 MG: 100 INJECTION, SOLUTION INTRAVENOUS at 12:32

## 2024-03-26 RX ADMIN — SUCCINYLCHOLINE CHLORIDE 140 MG: 20 INJECTION, SOLUTION INTRAMUSCULAR; INTRAVENOUS at 12:05

## 2024-03-26 RX ADMIN — SODIUM CHLORIDE, POTASSIUM CHLORIDE, SODIUM LACTATE AND CALCIUM CHLORIDE: 600; 310; 30; 20 INJECTION, SOLUTION INTRAVENOUS at 11:32

## 2024-03-26 RX ADMIN — ROCURONIUM BROMIDE 30 MG: 10 INJECTION INTRAVENOUS at 12:09

## 2024-03-26 RX ADMIN — MIDAZOLAM HYDROCHLORIDE 2 MG: 1 INJECTION, SOLUTION INTRAMUSCULAR; INTRAVENOUS at 11:29

## 2024-03-26 ASSESSMENT — PAIN - FUNCTIONAL ASSESSMENT
PAIN_FUNCTIONAL_ASSESSMENT: 0-10

## 2024-03-26 ASSESSMENT — PAIN SCALES - GENERAL
PAINLEVEL_OUTOF10: 0 - NO PAIN
PAIN_LEVEL: 0
PAINLEVEL_OUTOF10: 0 - NO PAIN
PAINLEVEL_OUTOF10: 0 - NO PAIN

## 2024-03-26 ASSESSMENT — COLUMBIA-SUICIDE SEVERITY RATING SCALE - C-SSRS
2. HAVE YOU ACTUALLY HAD ANY THOUGHTS OF KILLING YOURSELF?: NO
6. HAVE YOU EVER DONE ANYTHING, STARTED TO DO ANYTHING, OR PREPARED TO DO ANYTHING TO END YOUR LIFE?: NO
1. IN THE PAST MONTH, HAVE YOU WISHED YOU WERE DEAD OR WISHED YOU COULD GO TO SLEEP AND NOT WAKE UP?: NO

## 2024-03-26 NOTE — OP NOTE
"Exam under anesthesia, operative hysteroscopy Operative Note     Date: 3/26/2024  OR Location: [facility] OR    Name: Khadijah Shaw \"Yessica\", : 1990, Age: 33 y.o., MRN: [id number], Sex: female    Diagnosis  Pre-op Diagnosis     * Abnormal uterine bleeding (AUB) [N93.9] Post-op Diagnosis     * Abnormal uterine bleeding (AUB) [N93.9]     Procedures  Exam under anesthesia, operative hysteroscopy  19714 - [place] HYSTEROSCOPY ENDOMETRIAL ABLATION      Surgeons      * Margaret Nunn - Primary    Resident/Fellow/Other Assistant:  Surgeon(s) and Role:    Procedure Summary  Anesthesia: General  ASA: III  Anesthesia Staff: Anesthesiologist: Jermaine Perez MD; Ian Canchola DO  C-AA: MICHAELA Perez  Anesthesia Resident: Amena Lyons MD  Estimated Blood Loss: 25 mL  Intra-op Medications:   Administrations occurring from 1110 to 1225 on 24:   Medication Name Total Dose   sodium chloride 0.9 % irrigation solution 2,000 mL   surgical lubricant gel 1 Application   acetic acid (bulk) external solution 15 mL   iodine-potassium iodide (Lugol's) topical solution 1 Application              Anesthesia Record               Intraprocedure I/O Totals          Intake    LR infusion 600.00 mL    Total Intake 600 mL          Specimen:   ID Type Source Tests Collected by Time   1 : [facility] Tissue ENDOMETRIUM CURETTINGS SURGICAL PATHOLOGY EXAM Margaret Nunn MD 3/26/2024 1229        Staff:   Circulator: Chuyita Kern RN  Relief Circulator: Maria R Thomas RN  Scrub Person: Lisa Thomas; Stephanie Marques RN         Drains and/or Catheters: * None in log *    Tourniquet Times:         Implants:     Findings: Large posterior intracervical fibroid significantly distorting anatomy and making visualization of cervix impossible without vaginoscopy. Cervical canal entered, but due to extremely steep angle between cervical canal and endometrium, adequate access to cavity extremely limited.     Indications: " Yessica Shaw is an 33 y.o. female who is having surgery for Abnormal uterine bleeding (AUB) [N93.9].     The patient was brought to the operating room where anesthesia was administered without difficulty.  A speculum was placed in the vagina. Multiple attempts were made to visualize the cervix, both with different speculums/retractors, use of progressive ring forceps to bring cervix into view, and manual manipulation of fibroid/uterus. Despite best efforts, cervix was impossible to visualize and colposcopy could not be completed. She was then prepared and draped in normal sterile fashion. A speculum was placed in the vagina. Again, despite multiple techniques, the cervix could not be visualized and decision was made to access uterine cavity via vaginoscopy. The hysteroscopy was guided to the external cervical os (cervix appeared grossly normal though distorted due to fibroid) and into the cervical canal. Very lower edge of JOHN could be visualized, but because of anatomy (as above) cavity could not be adequately accessed. Biopsies were taken and the hysteroscope removed. Excellent hemostasis was noted. All instruments were then removed from the vagina.    The patient was awakened from general anesthesia and taken to recovery room in stable condition.      I was present for the entire procedure.        Complications:  None; patient tolerated the procedure well.    Disposition: PACU - hemodynamically stable.  Condition: stable         Margaret Nunn  Phone Number: [phone]

## 2024-03-26 NOTE — ANESTHESIA POSTPROCEDURE EVALUATION
"Patient: Khadijah Shaw \"Yessica\"    Procedure Summary       Date: 03/26/24 Room / Location: [facility] OR 03 / [facility] OR    Anesthesia Start: 1129 Anesthesia Stop: 1247    Procedure: Exam under anesthesia, operative hysteroscopy (Uterus) Diagnosis:       Abnormal uterine bleeding (AUB)      (Abnormal uterine bleeding (AUB) [N93.9])    Surgeons: Margaret Nunn MD Responsible Provider: Ian Canchola DO    Anesthesia Type: general ASA Status: 3            Anesthesia Type: general    Vitals Value Taken Time   /67 03/26/24 1241   Temp 37.2 03/26/24 1247   Pulse 91 03/26/24 1242   Resp 21 03/26/24 1242   SpO2 93 % 03/26/24 1242   Vitals shown include unvalidated device data.    Anesthesia Post Evaluation    Patient location during evaluation: PACU  Patient participation: complete - patient participated  Level of consciousness: awake  Pain score: 0  Pain management: adequate  Multimodal analgesia pain management approach  Airway patency: patent  Two or more strategies used to mitigate risk of obstructive sleep apnea  Cardiovascular status: acceptable  Respiratory status: acceptable  Hydration status: acceptable  Postoperative Nausea and Vomiting: none      No notable events documented.    "

## 2024-03-26 NOTE — PERIOPERATIVE NURSING NOTE
Reviewed Home going instructions with the patient and her family who stated they understood them.  The patient ambulated to the bathroom without difficulty. The chris pad had a small amount of bloody drainage on it. Patient dressed and ready to go home now. Waiting for transport.

## 2024-03-26 NOTE — ANESTHESIA PROCEDURE NOTES
Airway  Date/Time: 3/26/2024 11:44 AM  Urgency: elective    Airway not difficult    Staffing  Performed: resident   Authorized by: Jermaine Perez MD    Performed by: Amena Lyons MD  Patient location during procedure: OR    Indications and Patient Condition  Indications for airway management: anesthesia  Spontaneous Ventilation: absent  Sedation level: deep  Preoxygenated: yes  MILS maintained throughout  Mask difficulty assessment: 1 - vent by mask  Planned trial extubation    Final Airway Details  Final airway type: supraglottic airway      Successful airway: classic  Size 5     Number of attempts at approach: 1  Ventilation between attempts: none

## 2024-03-26 NOTE — ANESTHESIA PREPROCEDURE EVALUATION
"Patient: Khadijah Shaw \"Yessica\"    Procedure Information       Date/Time: 03/26/24 1110    Procedure: Exam under anesthesia, colposcopy, operative hysteroscopy with endometrial sampling    Location: [facility] OR 03 / [facility] OR    Surgeons: Margaret Nunn MD            Relevant Problems   Anesthesia (within normal limits)      Cardiovascular (within normal limits)      Endocrine (within normal limits)      /Renal (within normal limits)      Pulmonary (within normal limits)      GI/Hepatic (within normal limits)     Past Surgical History:   Procedure Laterality Date   • FOOT SURGERY      wart removal   • FRACTURE SURGERY      arm as child     No anesthesia complications.  Pt is NPO after midnight.    Clinical information reviewed:                   NPO Detail:  No data recorded     Physical Exam    Airway  Mallampati: III  TM distance: >3 FB  Neck ROM: full     Cardiovascular - normal exam  Rhythm: regular  Rate: normal     Dental - normal exam     Pulmonary - normal exam     Abdominal        Anesthesia Plan    History of general anesthesia?: yes  History of complications of general anesthesia?: no    ASA 3     general     intravenous induction   Postoperative administration of opioids is intended.  Anesthetic plan and risks discussed with patient.  Use of blood products discussed with patient who consented to blood products.    Plan discussed with attending and resident.  "

## 2024-03-27 ENCOUNTER — CLINICAL SUPPORT (OUTPATIENT)
Dept: OBSTETRICS AND GYNECOLOGY | Facility: CLINIC | Age: 34
End: 2024-03-27
Payer: COMMERCIAL

## 2024-03-27 ENCOUNTER — APPOINTMENT (OUTPATIENT)
Dept: OBSTETRICS AND GYNECOLOGY | Facility: CLINIC | Age: 34
End: 2024-03-27
Payer: COMMERCIAL

## 2024-03-27 DIAGNOSIS — D21.9 FIBROIDS: Primary | ICD-10-CM

## 2024-03-27 DIAGNOSIS — N93.9 ABNORMAL UTERINE BLEEDING (AUB): ICD-10-CM

## 2024-03-27 PROCEDURE — 2500000004 HC RX 250 GENERAL PHARMACY W/ HCPCS (ALT 636 FOR OP/ED): Mod: JZ | Performed by: STUDENT IN AN ORGANIZED HEALTH CARE EDUCATION/TRAINING PROGRAM

## 2024-03-27 PROCEDURE — 96372 THER/PROPH/DIAG INJ SC/IM: CPT | Performed by: STUDENT IN AN ORGANIZED HEALTH CARE EDUCATION/TRAINING PROGRAM

## 2024-03-27 RX ADMIN — LEUPROLIDE ACETATE 11.25 MG: KIT at 13:19

## 2024-03-27 NOTE — PROGRESS NOTES
Patient brought in her own Lupron from mail order pharmacy delivery. Lupron 11.25 mgt given IM to left gluteal region,tolerated well.

## 2024-04-02 LAB
LABORATORY COMMENT REPORT: NORMAL
PATH REPORT.FINAL DX SPEC: NORMAL
PATH REPORT.GROSS SPEC: NORMAL
PATH REPORT.RELEVANT HX SPEC: NORMAL
PATH REPORT.TOTAL CANCER: NORMAL

## 2024-04-17 NOTE — PROGRESS NOTES
"Division of Minimally Invasive Gynecologic Surgery  [facility]    04/17/24 Gynecology Visit    CC: Follow up fibroids, hysterectomy planning     Khadijah Shaw is a 33 y.o. s/p recent attempted colposcopy/hysteroscopic endometrial sampling in OR presents in follow up. Due to extreme distortion of normal anatomy from large cervical fibroid, sampling could not be completed.     First dose Lupron 3/27. Doing well, some mild hot flashes/night sweats/vaginal dryness, but nothing that is not manageable.     Imaging:   - Recent MRI pelvis showed uterus retroflexed and measuring 12cm x 16cm x 21cm. Largest fibroid measures 11cm x 15cm x 17cm.   - Outside US 11/2023 showed uterus measuring 22cm x 11cm x 13 cm, multiple fibroids.   Labs: Recent CBC and TSH at OSH reviewed via patient's phone, WNL   Screening:   - Last pap 2023 negative/HPV other+, no hx of CIN2-3  PMHx: None   PSHx: foot surgery, arm surgery as a child    PMHx, PSHx, SHx, Allergies, and Medications updated in Epic.    ROS: reviewed and negative    PE: /84   Pulse 83   Ht 1.6 m (5' 3\")   Wt 123 kg (272 lb)   LMP 04/16/2024   BMI 48.18 kg/m²    Constitutional:  No acute distress, well-nourished and well-developed  HEENT: EOM grossly intact, MMM, neck supple and with full ROM  Pulm:  Effort normal. No accessory muscle usage.  No respiratory distress.  Neurological:  She is alert and oriented to person place and time.  Skin: Warm, no pallor.  Psychiatric:  She has normal mood and affect.    A/P: Khadijah Shaw is a 33 y.o. s/p recent attempted colposcopy/hysteroscopic endometrial sampling in OR presents in follow up. Due to extreme distortion of normal anatomy from large cervical fibroid, sampling could not be completed.     We reviewed plan today for robotic hysterectomy w/ ovarian conservation. We reviewed R/B/A. We did discuss risk of occult malignancy w/ incomplete colposcopy/EMB, and " risk/benefits of contained morcellation vs larger incision. She expressed understanding, and is comfortable proceeding w/ contained morcellation.      Plan:  - Schedule for TRH-BS, any indicated procedure. PAT: Yes. Main only.   - Continue Lupron until surgery date     Margaret Nunn MD  Division of Minimally Invasive Gynecologic Surgery  [facility]

## 2024-04-18 ENCOUNTER — OFFICE VISIT (OUTPATIENT)
Dept: OBSTETRICS AND GYNECOLOGY | Facility: CLINIC | Age: 34
End: 2024-04-18
Payer: COMMERCIAL

## 2024-04-18 VITALS
DIASTOLIC BLOOD PRESSURE: 84 MMHG | BODY MASS INDEX: 48.2 KG/M2 | WEIGHT: 272 LBS | SYSTOLIC BLOOD PRESSURE: 150 MMHG | HEIGHT: 63 IN | HEART RATE: 83 BPM

## 2024-04-18 DIAGNOSIS — D25.0 INTRAMURAL, SUBMUCOUS, AND SUBSEROUS LEIOMYOMA OF UTERUS: Primary | ICD-10-CM

## 2024-04-18 DIAGNOSIS — D25.2 INTRAMURAL, SUBMUCOUS, AND SUBSEROUS LEIOMYOMA OF UTERUS: Primary | ICD-10-CM

## 2024-04-18 DIAGNOSIS — D25.1 INTRAMURAL, SUBMUCOUS, AND SUBSEROUS LEIOMYOMA OF UTERUS: Primary | ICD-10-CM

## 2024-04-18 PROCEDURE — 1036F TOBACCO NON-USER: CPT | Performed by: STUDENT IN AN ORGANIZED HEALTH CARE EDUCATION/TRAINING PROGRAM

## 2024-04-18 PROCEDURE — 99024 POSTOP FOLLOW-UP VISIT: CPT | Performed by: STUDENT IN AN ORGANIZED HEALTH CARE EDUCATION/TRAINING PROGRAM

## 2024-04-18 RX ORDER — ACETAMINOPHEN 325 MG/1
975 TABLET ORAL ONCE
Status: CANCELLED | OUTPATIENT
Start: 2024-04-18 | End: 2024-04-18

## 2024-04-18 RX ORDER — CELECOXIB 400 MG/1
400 CAPSULE ORAL ONCE
Status: CANCELLED | OUTPATIENT
Start: 2024-04-18 | End: 2024-04-18

## 2024-04-18 RX ORDER — GABAPENTIN 600 MG/1
600 TABLET ORAL ONCE
Status: CANCELLED | OUTPATIENT
Start: 2024-04-18 | End: 2024-04-18

## 2024-04-18 RX ORDER — HEPARIN SODIUM 5000 [USP'U]/ML
5000 INJECTION, SOLUTION INTRAVENOUS; SUBCUTANEOUS ONCE
Status: CANCELLED | OUTPATIENT
Start: 2024-04-18 | End: 2024-04-18

## 2024-04-18 ASSESSMENT — ENCOUNTER SYMPTOMS
CONSTITUTIONAL NEGATIVE: 0
CARDIOVASCULAR NEGATIVE: 0
MUSCULOSKELETAL NEGATIVE: 0
RESPIRATORY NEGATIVE: 0
EYES NEGATIVE: 0
ALLERGIC/IMMUNOLOGIC NEGATIVE: 0
HEMATOLOGIC/LYMPHATIC NEGATIVE: 0
ENDOCRINE NEGATIVE: 0
GASTROINTESTINAL NEGATIVE: 0
NEUROLOGICAL NEGATIVE: 0
PSYCHIATRIC NEGATIVE: 0

## 2024-04-18 ASSESSMENT — PAIN SCALES - GENERAL: PAINLEVEL: 0-NO PAIN

## 2024-04-24 PROBLEM — D25.1 INTRAMURAL, SUBMUCOUS, AND SUBSEROUS LEIOMYOMA OF UTERUS: Status: ACTIVE | Noted: 2024-04-18

## 2024-04-24 PROBLEM — D25.0 INTRAMURAL, SUBMUCOUS, AND SUBSEROUS LEIOMYOMA OF UTERUS: Status: ACTIVE | Noted: 2024-04-18

## 2024-04-24 PROBLEM — D25.2 INTRAMURAL, SUBMUCOUS, AND SUBSEROUS LEIOMYOMA OF UTERUS: Status: ACTIVE | Noted: 2024-04-18

## 2024-04-25 DIAGNOSIS — Z01.818 PREOP EXAMINATION: Primary | ICD-10-CM

## 2024-05-01 ENCOUNTER — TELEMEDICINE CLINICAL SUPPORT (OUTPATIENT)
Dept: PREADMISSION TESTING | Facility: HOSPITAL | Age: 34
End: 2024-05-01
Payer: COMMERCIAL

## 2024-05-01 RX ORDER — VIT C/E/ZN/COPPR/LUTEIN/ZEAXAN 250MG-90MG
25 CAPSULE ORAL DAILY
COMMUNITY

## 2024-05-01 ASSESSMENT — DUKE ACTIVITY SCORE INDEX (DASI)
CAN YOU TAKE CARE OF YOURSELF (EAT, DRESS, BATHE, OR USE TOILET): YES
CAN YOU DO LIGHT WORK AROUND THE HOUSE LIKE DUSTING OR WASHING DISHES: YES
CAN YOU CLIMB A FLIGHT OF STAIRS OR WALK UP A HILL: YES
CAN YOU DO HEAVY WORK AROUND THE HOUSE LIKE SCRUBBING FLOORS OR LIFTING AND MOVING HEAVY FURNITURE: YES
CAN YOU DO MODERATE WORK AROUND THE HOUSE LIKE VACUUMING, SWEEPING FLOORS OR CARRYING GROCERIES: YES
DASI METS SCORE: 9.9
CAN YOU PARTICIPATE IN STRENOUS SPORTS LIKE SWIMMING, SINGLES TENNIS, FOOTBALL, BASKETBALL, OR SKIING: YES
CAN YOU PARTICIPATE IN MODERATE RECREATIONAL ACTIVITIES LIKE GOLF, BOWLING, DANCING, DOUBLES TENNIS OR THROWING A BASEBALL OR FOOTBALL: YES
TOTAL_SCORE: 58.2
CAN YOU HAVE SEXUAL RELATIONS: YES
CAN YOU WALK A BLOCK OR TWO ON LEVEL GROUND: YES
CAN YOU DO YARD WORK LIKE RAKING LEAVES, WEEDING OR PUSHING A MOWER: YES
CAN YOU RUN A SHORT DISTANCE: YES
CAN YOU WALK INDOORS, SUCH AS AROUND YOUR HOUSE: YES

## 2024-05-01 ASSESSMENT — ENCOUNTER SYMPTOMS
SINUS CONGESTION: 1
GASTROINTESTINAL NEGATIVE: 1
CONSTITUTIONAL NEGATIVE: 1
CARDIOVASCULAR NEGATIVE: 1
RESPIRATORY NEGATIVE: 1
NEUROLOGICAL NEGATIVE: 1

## 2024-05-01 NOTE — CPM/PAT NURSE NOTE
"CPM/PAT Nurse Note      Name: Khadijah Shaw (Khadijah Shaw \"Yessica\")  /Age: 1990/33 y.o. is being seen in [facility] on 5/10/2024 for Total robotic hysterectomy, bilateral salpingectomy, any indicated procedure - Bilateral with Margaret Nunn MD on 2024.    PCP: Brett Ferrell [facility], P [phone], F [phone], LOV 2023    GYN: Margaret Nunn, , LOV 2024 Intramural, submucous, and subserous leiomyoma of uterus, fibroids, hysterectomy planning      Past Medical History:   Diagnosis Date    Anxiety     BMI 45.0-49.9, adult (Multi)     Depression     Dysfunctional uterine bleeding     Fractures     age 3    GERD (gastroesophageal reflux disease)     Hard to intubate     Leiomyoma     uterine    Rosacea     Uterine leiomyoma     Vitamin D deficiency        Past Surgical History:   Procedure Laterality Date    COLPOSCOPY      sampling of endometrium, EUA    FOOT SURGERY      wart removal    FRACTURE SURGERY      arm as child       Patient Sexual activity questions deferred to the physician.    Family History   Problem Relation Name Age of Onset    Hypertension Mother      Breast cancer Mother      Thyroid cancer Mother      Hypertension Father      Skin cancer Father      Hypertension Brother         Allergies   Allergen Reactions    Animal Dander Runny nose     sneezing    Bee Pollen Hives       Prior to Admission medications    Medication Sig Start Date End Date Taking? Authorizing Provider   cetirizine (ZyrTEC) 10 mg tablet Take by mouth.   Yes Historical Provider, MD   cholecalciferol (Vitamin D3) 25 MCG (1000 UT) capsule Take 1 capsule (25 mcg) by mouth once daily.   Yes Historical Provider, MD   escitalopram (Lexapro) 10 mg tablet Take 1 tablet (10 mg) by mouth once daily.   Yes Historical Provider, MD   leuprolide, 3-month, (Lupron Depot) 11.25 mg injection Inject 11.25 mg into the muscle every 3 months.  Patient taking differently: Inject 11.25 mg into the muscle " every 3 months. March 27th was last injection, won't be getting it anymore 3/4/24 3/4/25 Yes Margaret Nunn MD   Junel FE 1.5/30, 28, 1.5 mg-30 mcg (21)/75 mg (7) tablet Take 1 tablet by mouth once daily.    Historical Provider, MD   leuprolide, 3-month, (Lupron Depot, 3 month,) 11.25 mg injection Inject 11.25 mg into the muscle every 3 months. 3/20/24 5/1/24  Margaret Nunn MD        PAT ROS:   Constitutional:   neg    Neuro/Psych:   neg    Eyes:   Ears:   Nose:    sinus congestion  Mouth:   Throat:   neg    Neck:   Cardio:   neg    Respiratory:   neg    Endocrine:   GI:   neg    :   neg    Musculoskeletal:   Hematologic:   neg    Skin:      DASI Risk Score      Flowsheet Row Most Recent Value   DASI SCORE 58.2   METS Score (Will be calculated only when all the questions are answered) 9.9          Caprini DVT Assessment    No data to display       Modified Frailty Index    No data to display       CHADS2 Stroke Risk  Current as of yesterday        N/A 3 to 100%: High Risk   2 to < 3%: Medium Risk   0 to < 2%: Low Risk     Last Change: N/A          This score determines the patient's risk of having a stroke if the patient has atrial fibrillation.        This score is not applicable to this patient. Components are not calculated.          Revised Cardiac Risk Index    No data to display       Apfel Simplified Score    No data to display       Risk Analysis Index Results This Encounter    No data found in the last 1 encounters.         Nurse Plan of Action:     Medication Instructions: Please stop all vitamins, supplements, and any NSAIDs (Alevel, Ibuprofen, Motrin, etc) 7 days prior to surgery.

## 2024-05-10 ENCOUNTER — PRE-ADMISSION TESTING (OUTPATIENT)
Dept: PREADMISSION TESTING | Facility: HOSPITAL | Age: 34
End: 2024-05-10
Payer: COMMERCIAL

## 2024-05-10 VITALS
DIASTOLIC BLOOD PRESSURE: 86 MMHG | BODY MASS INDEX: 51.88 KG/M2 | HEART RATE: 90 BPM | OXYGEN SATURATION: 96 % | WEIGHT: 274.8 LBS | TEMPERATURE: 99 F | SYSTOLIC BLOOD PRESSURE: 128 MMHG | HEIGHT: 61 IN

## 2024-05-10 DIAGNOSIS — Z01.818 PREOP EXAMINATION: ICD-10-CM

## 2024-05-10 DIAGNOSIS — Z01.818 PREOPERATIVE EXAMINATION: Primary | ICD-10-CM

## 2024-05-10 LAB
ABO GROUP (TYPE) IN BLOOD: NORMAL
ANION GAP SERPL CALC-SCNC: 17 MMOL/L (ref 10–20)
ANTIBODY SCREEN: NORMAL
BUN SERPL-MCNC: 11 MG/DL (ref 6–23)
CALCIUM SERPL-MCNC: 10.1 MG/DL (ref 8.6–10.6)
CHLORIDE SERPL-SCNC: 105 MMOL/L (ref 98–107)
CO2 SERPL-SCNC: 24 MMOL/L (ref 21–32)
CREAT SERPL-MCNC: 0.63 MG/DL (ref 0.5–1.05)
EGFRCR SERPLBLD CKD-EPI 2021: >90 ML/MIN/1.73M*2
ERYTHROCYTE [DISTWIDTH] IN BLOOD BY AUTOMATED COUNT: 13.1 % (ref 11.5–14.5)
GLUCOSE SERPL-MCNC: 95 MG/DL (ref 74–99)
HCT VFR BLD AUTO: 43.7 % (ref 36–46)
HGB BLD-MCNC: 13.6 G/DL (ref 12–16)
MCH RBC QN AUTO: 27.3 PG (ref 26–34)
MCHC RBC AUTO-ENTMCNC: 31.1 G/DL (ref 32–36)
MCV RBC AUTO: 88 FL (ref 80–100)
NRBC BLD-RTO: 0 /100 WBCS (ref 0–0)
PLATELET # BLD AUTO: 274 X10*3/UL (ref 150–450)
POTASSIUM SERPL-SCNC: 4.7 MMOL/L (ref 3.5–5.3)
RBC # BLD AUTO: 4.98 X10*6/UL (ref 4–5.2)
RH FACTOR (ANTIGEN D): NORMAL
SODIUM SERPL-SCNC: 141 MMOL/L (ref 136–145)
WBC # BLD AUTO: 8.8 X10*3/UL (ref 4.4–11.3)

## 2024-05-10 PROCEDURE — 82374 ASSAY BLOOD CARBON DIOXIDE: CPT

## 2024-05-10 PROCEDURE — 36415 COLL VENOUS BLD VENIPUNCTURE: CPT

## 2024-05-10 PROCEDURE — 99214 OFFICE O/P EST MOD 30 MIN: CPT | Performed by: NURSE PRACTITIONER

## 2024-05-10 PROCEDURE — 86901 BLOOD TYPING SEROLOGIC RH(D): CPT

## 2024-05-10 PROCEDURE — 85027 COMPLETE CBC AUTOMATED: CPT

## 2024-05-10 ASSESSMENT — ENCOUNTER SYMPTOMS
NECK NEGATIVE: 1
ENDOCRINE NEGATIVE: 1
RESPIRATORY NEGATIVE: 1
EYES NEGATIVE: 1
MUSCULOSKELETAL NEGATIVE: 1
GASTROINTESTINAL NEGATIVE: 1
CONSTITUTIONAL NEGATIVE: 1
CARDIOVASCULAR NEGATIVE: 1
NEUROLOGICAL NEGATIVE: 1

## 2024-05-10 ASSESSMENT — DUKE ACTIVITY SCORE INDEX (DASI)
CAN YOU WALK A BLOCK OR TWO ON LEVEL GROUND: YES
DASI METS SCORE: 9.9
CAN YOU RUN A SHORT DISTANCE: YES
CAN YOU CLIMB A FLIGHT OF STAIRS OR WALK UP A HILL: YES
CAN YOU HAVE SEXUAL RELATIONS: YES
CAN YOU DO LIGHT WORK AROUND THE HOUSE LIKE DUSTING OR WASHING DISHES: YES
CAN YOU DO MODERATE WORK AROUND THE HOUSE LIKE VACUUMING, SWEEPING FLOORS OR CARRYING GROCERIES: YES
CAN YOU TAKE CARE OF YOURSELF (EAT, DRESS, BATHE, OR USE TOILET): YES
CAN YOU DO YARD WORK LIKE RAKING LEAVES, WEEDING OR PUSHING A MOWER: YES
CAN YOU DO HEAVY WORK AROUND THE HOUSE LIKE SCRUBBING FLOORS OR LIFTING AND MOVING HEAVY FURNITURE: YES
CAN YOU PARTICIPATE IN STRENOUS SPORTS LIKE SWIMMING, SINGLES TENNIS, FOOTBALL, BASKETBALL, OR SKIING: YES
TOTAL_SCORE: 58.2
CAN YOU PARTICIPATE IN MODERATE RECREATIONAL ACTIVITIES LIKE GOLF, BOWLING, DANCING, DOUBLES TENNIS OR THROWING A BASEBALL OR FOOTBALL: YES
CAN YOU WALK INDOORS, SUCH AS AROUND YOUR HOUSE: YES

## 2024-05-10 ASSESSMENT — LIFESTYLE VARIABLES: SMOKING_STATUS: NONSMOKER

## 2024-05-10 NOTE — CPM/PAT H&P
"CPM/PAT Evaluation       Name: Khadijah Shaw (Khadijah Shaw \"Yessica\")  /Age: 1990/33 y.o.     Visit Type:   In-Person       Chief Complaint: leiomyoma scheduled for surgery    HPI: Patient is a 33-year-old female scheduled for total robotic hysterectomy with bilateral salpingectomy on May 20, 2024 for treatment of leiomyoma.  The patient is referred by Dr. Margaret Nunn for preoperative evaluation of anxiety, depression, abnormal uterine bleeding and uterine leiomyoma scheduled for surgery, rosacea, vitamin D deficiency, GERD.    Past Medical History:   Diagnosis Date    Anxiety     BMI 45.0-49.9, adult (Multi)     Depression     Dysfunctional uterine bleeding     Fractures     age 3    GERD (gastroesophageal reflux disease)     Hard to intubate     Pt reports being told she was difficult to intubate during procedure 3/26/2024 but anesthesia note from 3/26/2024 does not state that    Leiomyoma     uterine    Rosacea     Uterine leiomyoma     Vitamin D deficiency        Past Surgical History:   Procedure Laterality Date    COLPOSCOPY      sampling of endometrium, EUA    FOOT SURGERY      wart removal    FRACTURE SURGERY      arm as child       Patient Sexual activity questions deferred to the physician.    Family History   Problem Relation Name Age of Onset    Hypertension Mother      Breast cancer Mother      Thyroid cancer Mother      Hypertension Father      Skin cancer Father      Hypertension Brother         Allergies   Allergen Reactions    Animal Dander Runny nose     sneezing    Bee Pollen Hives       Prior to Admission medications    Medication Sig Start Date End Date Taking? Authorizing Provider   cetirizine (ZyrTEC) 10 mg tablet Take by mouth.   Yes Historical Provider, MD   escitalopram (Lexapro) 10 mg tablet Take 1 tablet (10 mg) by mouth once daily.   Yes Historical Provider, MD   cholecalciferol (Vitamin D3) 25 MCG (1000 UT) capsule Take 1 capsule (25 mcg) by mouth once " daily.    Historical Provider, MD   leuprolide, 3-month, (Lupron Depot) 11.25 mg injection Inject 11.25 mg into the muscle every 3 months.  Patient taking differently: Inject 11.25 mg into the muscle every 3 months. March 27th was last injection, won't be getting it anymore 3/4/24 3/4/25  Margaret Nunn MD   Junel FE 1.5/30, 28, 1.5 mg-30 mcg (21)/75 mg (7) tablet Take 1 tablet by mouth once daily.  5/10/24  Historical Provider, MD MERCADO ROS:   Constitutional:   neg    Neuro/Psych:   neg    Eyes:   neg    Ears:   neg    Nose:   neg    Mouth:   neg    Throat:   neg    Neck:   neg    Cardio:   neg    Respiratory:   neg    Endocrine:   neg    GI:   neg    :   neg    Musculoskeletal:   neg    Hematologic:   neg    Skin:  neg        Physical Exam  Vitals reviewed.   Constitutional:       Appearance: Normal appearance. She is morbidly obese.   HENT:      Head: Normocephalic.      Mouth/Throat:      Mouth: Mucous membranes are moist.   Eyes:      Conjunctiva/sclera: Conjunctivae normal.   Neck:      Vascular: No carotid bruit.   Cardiovascular:      Rate and Rhythm: Normal rate and regular rhythm.      Pulses: Normal pulses.      Heart sounds: Normal heart sounds.   Pulmonary:      Effort: Pulmonary effort is normal.      Breath sounds: Normal breath sounds.   Abdominal:      Palpations: Abdomen is soft.      Tenderness: There is no abdominal tenderness.   Musculoskeletal:         General: Normal range of motion.      Cervical back: Normal range of motion.      Right lower leg: No edema.      Left lower leg: No edema.   Lymphadenopathy:      Cervical: No cervical adenopathy.   Skin:     General: Skin is warm and dry.      Capillary Refill: Capillary refill takes less than 2 seconds.   Neurological:      General: No focal deficit present.      Mental Status: She is alert and oriented to person, place, and time.   Psychiatric:         Mood and Affect: Mood normal.         Behavior: Behavior normal.         Thought  Content: Thought content normal.         Judgment: Judgment normal.          PAT AIRWAY:   Airway:     Mallampati::  III    Neck ROM::  Full  normal        Visit Vitals  /86 Comment: manual   Pulse 90   Temp 37.2 °C (99 °F)       DASI Risk Score      Flowsheet Row Most Recent Value   DASI SCORE 58.2   METS Score (Will be calculated only when all the questions are answered) 9.9          Caprini DVT Assessment      Flowsheet Row Most Recent Value   DVT Score 8   Current Status Major surgery planned, lasting over 3 hours   Age Less than 40 years   BMI Greater than 50 (Venous stasis syndrome)          Modified Frailty Index      Flowsheet Row Most Recent Value   Modified Frailty Index Calculator 0          CHADS2 Stroke Risk  Current as of 8 minutes ago        N/A 3 to 100%: High Risk   2 to < 3%: Medium Risk   0 to < 2%: Low Risk     Last Change: N/A          This score determines the patient's risk of having a stroke if the patient has atrial fibrillation.        This score is not applicable to this patient. Components are not calculated.          Revised Cardiac Risk Index      Flowsheet Row Most Recent Value   Revised Cardiac Risk Calculator 1          Apfel Simplified Score      Flowsheet Row Most Recent Value   Apfel Simplified Score Calculator 3          Risk Analysis Index Results This Encounter    No data found in the last 1 encounters.       Stop Bang Score      Flowsheet Row Most Recent Value   Do you snore loudly? 0   Do you often feel tired or fatigued after your sleep? 0   Has anyone ever observed you stop breathing in your sleep? 0   Do you have or are you being treated for high blood pressure? 0   Recent BMI (Calculated) 48.2   Is BMI greater than 35 kg/m2? 1=Yes   Age older than 50 years old? 0=No   Is your neck circumference greater than 17 inches (Male) or 16 inches (Female)? 1   Gender - Male 0=No   STOP-BANG Total Score 2              Assessment and Plan:   Neuro: Anxiety and depression  managed on escitalopram (continue).    The patient is at an increased risk for post operative delirium secondary to depression and type and duration of surgery.  Preoperative brain exercise educational handout provided to patient.    The patient is at an increased risk for perioperative stroke secondary to female sex , general anesthesia, and op time >2.5 hours.    HEENT/Airway:  No diagnosis or significant findings on chart review or clinical presentation and evaluation.     Cardiovascular:  No diagnosis or significant findings on chart review or clinical presentation and evaluation however see below risk screening tools. No additional preoperative testing is currently indicated.    METS are 9.9    RCRI  1 which is 6% 30 day risk of MACE (risk for cardiac death, nonfatal myocardial infarction, and nonfactal cardiac arrest    FILEMON score which indicates a 0.3% risk of intraoperative or 30-day postoperative MACE      Pulmonary:  No diagnosis or significant findings on chart review or clinical presentation and evaluation however see below risk screening tools.  Preoperative deep breathing educational handout provided to patient.    ARISCAT:   23   points which is a low (1.6%) risk of in-hospital post-op pulmonary complications     PRODIGY:  0  points which is a low risk of post op opioid induced respiratory depression episodes    STOP BAN  points which is a low risk for moderate to severe WES    Renal: No diagnosis or significant findings on chart review or clinical presentation and evaluation, however, the patient is at increased risk of perioperative renal complications secondary to intraperitoneal surgery. Preventative measures include preoperative hydration.    Endocrine: vitamin D deficiency managed on oral supplementation (hold day of surgery).     Hematologic:   No diagnosis or significant findings on chart review or clinical presentation and evaluation however see below risk screening tool.  Preoperative  "DVT educational handout provided to patient.    Caprini Score:  8  points which is a highest risk of perioperative VTE    Gastrointestinal:   GERD managed with diet modifications and no need for preoperative intervention.    EAT-10 score of  0 - self-perceived oropharyngeal dysphagia scale (0-40)     Apfel: 3 points 61% risk for post operative N/V    Infectious disease: No diagnosis or significant findings on chart review or clinical presentation and evaluation.      Musculoskeletal: No diagnosis or significant findings on chart review or clinical presentation and evaluation.      Other:   Patient states that following her EUA and colposcopy/hysteroscopy on 03/26/2024 she was told that she was \"difficult to intubate\".  However further review of anesthesia note states the patient was not difficult to intubate.  She states that her throat was sore following intubation.  Most likely related to Mallampati 3 status.  Discussed with patient using throat lozenges and warm liquids following surgery to ease sore throat that may be associated with intubation.    abnormal uterine bleeding and uterine leiomyoma scheduled for surgery currently managed on Lupron q3 months, last dose 03/26/24.  Discontinued oral contraceptive approx. 2 weeks following March 2024 Lupron injection.       Labs ordered  Results for orders placed or performed in visit on 05/10/24 (from the past 96 hour(s))   CBC   Result Value Ref Range    WBC 8.8 4.4 - 11.3 x10*3/uL    nRBC 0.0 0.0 - 0.0 /100 WBCs    RBC 4.98 4.00 - 5.20 x10*6/uL    Hemoglobin 13.6 12.0 - 16.0 g/dL    Hematocrit 43.7 36.0 - 46.0 %    MCV 88 80 - 100 fL    MCH 27.3 26.0 - 34.0 pg    MCHC 31.1 (L) 32.0 - 36.0 g/dL    RDW 13.1 11.5 - 14.5 %    Platelets 274 150 - 450 x10*3/uL   Basic Metabolic Panel   Result Value Ref Range    Glucose 95 74 - 99 mg/dL    Sodium 141 136 - 145 mmol/L    Potassium 4.7 3.5 - 5.3 mmol/L    Chloride 105 98 - 107 mmol/L    Bicarbonate 24 21 - 32 mmol/L    Anion " Gap 17 10 - 20 mmol/L    Urea Nitrogen 11 6 - 23 mg/dL    Creatinine 0.63 0.50 - 1.05 mg/dL    eGFR >90 >60 mL/min/1.73m*2    Calcium 10.1 8.6 - 10.6 mg/dL   Type And Screen   Result Value Ref Range    ABO TYPE O     Rh TYPE POS     ANTIBODY SCREEN NEG

## 2024-05-10 NOTE — PREPROCEDURE INSTRUCTIONS
Thank you for visiting [facility] ([facility]) today for your pre-procedure evaluation, you were seen by     Samantha Meeson, MSN, NP-C  Adult-Gerontology Nurse Practitioner II  Department of Anesthesiology and Perioperative Medicine  Main phone [phone]  Direct phone [phone]  Fax [phone]     This summary includes instructions and information to aid you during your perioperative period.  Please read carefully. If you have any questions about your visit today, please call the number listed above.  If you become ill or have any changes to your health before your surgery, please contact your primary care provider and alert your surgeon.    Preparing for your Surgery       Exercises  Preoperative Deep Breathing Exercises  Why it is important to do deep breathing exercises before my surgery?  Deep breathing exercises strengthen your breathing muscles.  This helps you to recover after your surgery and decreases the chance of breathing complications.  How are the deep breathing exercises done?  Sit straight with your back supported.  Breathe in deeply and slowly through your nose. Your lower rib cage should expand and your abdomen may move forward.  Hold that breath for 3 to 5 seconds.  Breathe out through pursed lips, slowly and completely.  Rest and repeat 10 times every hour while awake.  Rest longer if you become dizzy or lightheaded.       Incentive Spirometer   You were provided with an incentive spirometer in [facility], please follow the below instructions.   You were not provided an incentive spirometer in [facility], please disregard the incentive spirometer instructions  What is an incentive spirometer?  An incentive spirometer is a device used before and after surgery to “exercise” your lungs.  It helps you to take deeper breaths to expand your lungs.  Below is an example of a basic incentive spirometer.  The device you receive may differ slightly but they all function the  same.    Why do I need to use an incentive spirometer?  Using your incentive spirometer prepares your lungs for surgery and helps prevent lung problems after surgery.  How do I use my incentive spirometer?  When you're using your incentive spirometer, make sure to breathe through your mouth. If you breathe through your nose, the incentive spirometer won't work properly. You can hold your nose if you have trouble.  If you feel dizzy at any time, stop and rest. Try again at a later time.  Follow the steps below:  Set up your incentive spirometer, expand the flexible tubing and connect to the outlet.  Sit upright in a chair or bed. Hold the incentive spirometer at eye level.   Put the mouthpiece in your mouth and close your lips tightly around it. Slowly breathe out (exhale) completely.  Breathe in (inhale) slowly through your mouth as deeply as you can. As you take a breath, you will see the piston rise inside the large column. While the piston rises, the indicator should move upwards. It should stay in between the 2 arrows (see Figure).  Try to get the piston as high as you can, while keeping the indicator between the arrows.   If the indicator doesn't stay between the arrows, you're breathing either too fast or too slow.  When you get it as high as you can, hold your breath for 10 seconds, or as long as possible. While you're holding your breath, the piston will slowly fall to the base of the spirometer.  Once the piston reaches the bottom of the spirometer, breathe out slowly through your mouth. Rest for a few seconds.  Repeat 10 times. Try to get the piston to the same level with each breath.  Repeat every hour while awake  You can carefully clean the outside of the mouthpiece with an alcohol wipe or soap and water.      Preoperative Brain Exercises    What are brain exercises?  A brain exercise is any activity that engages your thinking (cognitive) skills.    What types of activities are considered brain  exercises?  Jigsaw puzzles, crossword puzzles, word jumble, memory games, word search, and many more.  Many can be found free online or on your phone via a mobile tee.    Why should I do brain exercises before my surgery?  More recent research has shown brain exercise before surgery can lower the risk of postoperative delirium (confusion) which can be especially important for older adults.  Patients who did brain exercises for 5 to 10 hours the days before surgery, cut their risk of postoperative delirium in half up to 1 week after surgery.    Sit-to-Stand Exercise    What is the sit-to-stand exercise?  The sit-to-stand exercise strengthens the muscles of your lower body and muscles in the center of your body (core muscles for stability) helping to maintain and improve your strength and mobility.  How do I do the sit-to-stand exercise?  The goal is to do this exercise without using your arms or hands.  If this is too difficult, use your arms and hands or a chair with armrests to help slowly push yourself to the standing position and lower yourself back to the sitting position. As the movement becomes easier use your arms and hands less.    Steps to the sit-to-stand exercise  Sit up tall in a sturdy chair, knees bent, feet flat on the floor shoulder-width apart.  Shift your hips/pelvis forward in the chair to correctly position yourself for the next movement.  Lean forward at your hips.  Stand up straight putting equal weight on both feet.  Check to be sure you are properly aligned with the chair, in a slow controlled movement sit back down.  Repeat this exercise 10-15 times.  If needed you can do it fewer times until your strength improves.  Rest for 1 minute.  Do another 10-15 sit-to-stand exercises.  Try to do this in the morning and evening.        Instructions    Preoperative Fasting Guidelines    Why must I stop eating and drinking near surgery time?  With sedation, food or liquid in your stomach can enter your  lungs causing serious complications  Food can increase nausea and vomiting  When do I need to stop eating and drinking before my surgery?      Do not eat any food after midnight the night before your surgery/procedure. You may have up to 13.5 ounces of clear liquid until TWO hours before your instructed arrival time to the hospital.  This includes water, black tea/coffee, (no milk or cream) apple juice, and electrolyte drinks (Gatorade). You may chew gum until TWO hours before your surgery/procedure        Simple things you can do to help prevent blood clots     Blood clots are blockages that can form in the body's veins. When a blood clot forms in your deep veins, it may be called a deep vein thrombosis, or DVT for short. Blood clots can happen in any part of the body where blood flows, but they are most common in the arms and legs. If a piece of a blood clot breaks free and travels to the lungs, it is called a pulmonary embolus (PE). A PE can be a very serious problem.         Being in the hospital or having surgery can raise your chances of getting a blood clot because you may not be well enough to move around as much as you normally do.         Ways you can help prevent blood clots in the hospital       Wearing SCDs  SCDs stands for Sequential Compression Devices.   SCDs are special sleeves that wrap around your legs. They attach to a pump that fills them with air to gently squeeze your legs every few minutes.  This helps return the blood in your legs to your heart.   SCDs should only be taken off when walking or bathing. SCDs may not be comfortable, but they can help save your life.              Pump SCD leg sleeves  Wearing compression stockings - if your doctor orders them. These special snug-fitting stockings gently squeeze your legs to help blood flow.       Walking. Walking helps move the blood in your legs.   If your doctor says it is ok, try walking the halls at least   5 times a day. Ask us to help you  get up, so you don't fall.      Taking any blood-thinning medicines your doctor orders.              Ways you can help prevent blood clots at home         Wearing compression stockings - if your doctor orders them.   Walking - to help move the blood in your legs.    Taking any blood-thinning medicines your doctor orders.      Signs of a blood clot or PE    Tell your doctor or nurse right away if you have any of the problems listed below.         If you are at home, seek medical care right away. Call 911 for chest pain or problems breathing.            Signs of a blood clot (DVT) - such as pain, swelling, redness, or warmth in your arm or legs.  Signs of a pulmonary embolism (PE) - such as chest pain or feeling short of breath      Tobacco and Alcohol;  Do not drink alcohol or smoke within 24 hours of surgery.  It is best to quit smoking for as long as possible before any surgery or procedure.    The Week before Surgery        Seven days before Surgery  Check your [facility] medication instructions  Do the exercises provided to you by [facility]   Arrange for a responsible, adult licensed  to take you home after surgery and stay with you for 24 hours.  You will not be permitted to drive yourself home if you have received any anesthetic/sedation  Six days before surgery  Check your [facility] medication instructions  Do the exercises provided to you by [facility]   Start using Chlorhexidene (CHG) body wash if prescribed  Five days before surgery  Check your [facility] medication instructions  Do the exercises provided to you by [facility]   Continue to use CHG body wash if prescribed  Three days before surgery  Check your [facility] medication instructions  Do the exercises provided to you by [facility]   Continue to use CHG body wash if prescribed  Two days before surgery  Check your [facility] medication instructions  Do the exercises provided to you by [facility]   Continue to use CHG body wash if prescribed    The Day before Surgery       Check your [facility] medication and all  other [facility] instructions including when to stop eating and drinking  You will be called with your arrival time for surgery in the late afternoon.  If you do not receive a call please reach out to your surgeon's office.  Do not smoke or drink 24 hours before surgery  Prepare items to bring with you to the hospital  Shower with your chlorhexidine wash if prescribed  Brush your teeth and use your chlorhexidine dental rinse if prescribed    The Day of Surgery       Check your [facility] medication instructions  Ensure you follow the instructions for when to stop eating and drinking  Shower, if prescribed use CHG.  Do not apply any lotions, creams, moisturizers, perfume or deodorant  Brush your teeth and use your CHG dental rinse if prescribed  Wear loose comfortable clothing  Avoid make-up  Remove  jewelry and piercings, consider professional piercing removal with a plastic spacer if needed  Bring photo ID and Insurance card  Bring an accurate medication list that includes medication dose, frequency and allergies  Bring a copy of your advanced directives (will, health care power of )  Bring any devices and controllers as well as medical devices you have been provided with for surgery (CPAP, slings, braces, etc.)  Dentures, eyeglasses, and contacts will be removed before surgery, please bring cases for contacts or glasses

## 2024-05-17 ENCOUNTER — ANESTHESIA EVENT (OUTPATIENT)
Dept: OPERATING ROOM | Facility: HOSPITAL | Age: 34
End: 2024-05-17
Payer: COMMERCIAL

## 2024-05-20 ENCOUNTER — HOSPITAL ENCOUNTER (OUTPATIENT)
Facility: HOSPITAL | Age: 34
Discharge: HOME | End: 2024-05-21
Attending: STUDENT IN AN ORGANIZED HEALTH CARE EDUCATION/TRAINING PROGRAM | Admitting: STUDENT IN AN ORGANIZED HEALTH CARE EDUCATION/TRAINING PROGRAM
Payer: COMMERCIAL

## 2024-05-20 ENCOUNTER — ANESTHESIA (OUTPATIENT)
Dept: OPERATING ROOM | Facility: HOSPITAL | Age: 34
End: 2024-05-20
Payer: COMMERCIAL

## 2024-05-20 DIAGNOSIS — G89.18 POST-OPERATIVE PAIN: ICD-10-CM

## 2024-05-20 DIAGNOSIS — D25.0 INTRAMURAL, SUBMUCOUS, AND SUBSEROUS LEIOMYOMA OF UTERUS: Primary | ICD-10-CM

## 2024-05-20 DIAGNOSIS — D25.1 INTRAMURAL, SUBMUCOUS, AND SUBSEROUS LEIOMYOMA OF UTERUS: Primary | ICD-10-CM

## 2024-05-20 DIAGNOSIS — D25.2 INTRAMURAL, SUBMUCOUS, AND SUBSEROUS LEIOMYOMA OF UTERUS: Primary | ICD-10-CM

## 2024-05-20 LAB
ABO GROUP (TYPE) IN BLOOD: NORMAL
PREGNANCY TEST URINE, POC: NEGATIVE
RH FACTOR (ANTIGEN D): NORMAL

## 2024-05-20 PROCEDURE — 7100000002 HC RECOVERY ROOM TIME - EACH INCREMENTAL 1 MINUTE: Performed by: STUDENT IN AN ORGANIZED HEALTH CARE EDUCATION/TRAINING PROGRAM

## 2024-05-20 PROCEDURE — 3700000001 HC GENERAL ANESTHESIA TIME - INITIAL BASE CHARGE: Performed by: STUDENT IN AN ORGANIZED HEALTH CARE EDUCATION/TRAINING PROGRAM

## 2024-05-20 PROCEDURE — S2900 ROBOTIC SURGICAL SYSTEM: HCPCS | Performed by: STUDENT IN AN ORGANIZED HEALTH CARE EDUCATION/TRAINING PROGRAM

## 2024-05-20 PROCEDURE — A58573 PR LAPAROSCOPY TOT HYSTERECTOMY UTERUS >250 GRAM W TUBE/OVARY: Performed by: STUDENT IN AN ORGANIZED HEALTH CARE EDUCATION/TRAINING PROGRAM

## 2024-05-20 PROCEDURE — 36415 COLL VENOUS BLD VENIPUNCTURE: CPT | Performed by: ANESTHESIOLOGY

## 2024-05-20 PROCEDURE — 2500000004 HC RX 250 GENERAL PHARMACY W/ HCPCS (ALT 636 FOR OP/ED)

## 2024-05-20 PROCEDURE — 96372 THER/PROPH/DIAG INJ SC/IM: CPT | Mod: 59 | Performed by: STUDENT IN AN ORGANIZED HEALTH CARE EDUCATION/TRAINING PROGRAM

## 2024-05-20 PROCEDURE — 88307 TISSUE EXAM BY PATHOLOGIST: CPT | Mod: TC,SUR,PARLAB,WESLAB | Performed by: STUDENT IN AN ORGANIZED HEALTH CARE EDUCATION/TRAINING PROGRAM

## 2024-05-20 PROCEDURE — A4217 STERILE WATER/SALINE, 500 ML: HCPCS | Performed by: STUDENT IN AN ORGANIZED HEALTH CARE EDUCATION/TRAINING PROGRAM

## 2024-05-20 PROCEDURE — 2500000001 HC RX 250 WO HCPCS SELF ADMINISTERED DRUGS (ALT 637 FOR MEDICARE OP): Performed by: STUDENT IN AN ORGANIZED HEALTH CARE EDUCATION/TRAINING PROGRAM

## 2024-05-20 PROCEDURE — 2500000004 HC RX 250 GENERAL PHARMACY W/ HCPCS (ALT 636 FOR OP/ED): Mod: JG

## 2024-05-20 PROCEDURE — 2500000004 HC RX 250 GENERAL PHARMACY W/ HCPCS (ALT 636 FOR OP/ED): Performed by: STUDENT IN AN ORGANIZED HEALTH CARE EDUCATION/TRAINING PROGRAM

## 2024-05-20 PROCEDURE — 58573 TLH W/T/O UTERUS OVER 250 G: CPT | Performed by: STUDENT IN AN ORGANIZED HEALTH CARE EDUCATION/TRAINING PROGRAM

## 2024-05-20 PROCEDURE — 3700000002 HC GENERAL ANESTHESIA TIME - EACH INCREMENTAL 1 MINUTE: Performed by: STUDENT IN AN ORGANIZED HEALTH CARE EDUCATION/TRAINING PROGRAM

## 2024-05-20 PROCEDURE — 2720000007 HC OR 272 NO HCPCS: Performed by: STUDENT IN AN ORGANIZED HEALTH CARE EDUCATION/TRAINING PROGRAM

## 2024-05-20 PROCEDURE — 7100000001 HC RECOVERY ROOM TIME - INITIAL BASE CHARGE: Performed by: STUDENT IN AN ORGANIZED HEALTH CARE EDUCATION/TRAINING PROGRAM

## 2024-05-20 PROCEDURE — 2500000002 HC RX 250 W HCPCS SELF ADMINISTERED DRUGS (ALT 637 FOR MEDICARE OP, ALT 636 FOR OP/ED)

## 2024-05-20 PROCEDURE — 3600000018 HC OR TIME - INITIAL BASE CHARGE - PROCEDURE LEVEL SIX: Performed by: STUDENT IN AN ORGANIZED HEALTH CARE EDUCATION/TRAINING PROGRAM

## 2024-05-20 PROCEDURE — 2780000003 HC OR 278 NO HCPCS: Performed by: STUDENT IN AN ORGANIZED HEALTH CARE EDUCATION/TRAINING PROGRAM

## 2024-05-20 PROCEDURE — 88307 TISSUE EXAM BY PATHOLOGIST: CPT | Performed by: PATHOLOGY

## 2024-05-20 PROCEDURE — 2500000005 HC RX 250 GENERAL PHARMACY W/O HCPCS

## 2024-05-20 PROCEDURE — 3600000017 HC OR TIME - EACH INCREMENTAL 1 MINUTE - PROCEDURE LEVEL SIX: Performed by: STUDENT IN AN ORGANIZED HEALTH CARE EDUCATION/TRAINING PROGRAM

## 2024-05-20 RX ORDER — SODIUM CHLORIDE 9 MG/ML
INJECTION, SOLUTION INTRAVENOUS
Status: DISPENSED
Start: 2024-05-20 | End: 2024-05-21

## 2024-05-20 RX ORDER — LABETALOL HYDROCHLORIDE 5 MG/ML
5 INJECTION, SOLUTION INTRAVENOUS ONCE AS NEEDED
Status: DISCONTINUED | OUTPATIENT
Start: 2024-05-20 | End: 2024-05-21 | Stop reason: HOSPADM

## 2024-05-20 RX ORDER — SODIUM CHLORIDE 0.9 G/100ML
IRRIGANT IRRIGATION AS NEEDED
Status: DISCONTINUED | OUTPATIENT
Start: 2024-05-20 | End: 2024-05-20 | Stop reason: HOSPADM

## 2024-05-20 RX ORDER — ALBUTEROL SULFATE 0.83 MG/ML
2.5 SOLUTION RESPIRATORY (INHALATION) ONCE AS NEEDED
Status: DISCONTINUED | OUTPATIENT
Start: 2024-05-20 | End: 2024-05-21 | Stop reason: HOSPADM

## 2024-05-20 RX ORDER — PROPOFOL 10 MG/ML
INJECTION, EMULSION INTRAVENOUS AS NEEDED
Status: DISCONTINUED | OUTPATIENT
Start: 2024-05-20 | End: 2024-05-20

## 2024-05-20 RX ORDER — ONDANSETRON HYDROCHLORIDE 2 MG/ML
INJECTION, SOLUTION INTRAVENOUS AS NEEDED
Status: DISCONTINUED | OUTPATIENT
Start: 2024-05-20 | End: 2024-05-20

## 2024-05-20 RX ORDER — ACETAMINOPHEN 325 MG/1
975 TABLET ORAL EVERY 6 HOURS PRN
Status: DISCONTINUED | OUTPATIENT
Start: 2024-05-20 | End: 2024-05-21 | Stop reason: HOSPADM

## 2024-05-20 RX ORDER — ONDANSETRON HYDROCHLORIDE 2 MG/ML
4 INJECTION, SOLUTION INTRAVENOUS ONCE AS NEEDED
Status: DISCONTINUED | OUTPATIENT
Start: 2024-05-20 | End: 2024-05-21 | Stop reason: HOSPADM

## 2024-05-20 RX ORDER — LIDOCAINE HYDROCHLORIDE 20 MG/ML
INJECTION, SOLUTION INFILTRATION; PERINEURAL AS NEEDED
Status: DISCONTINUED | OUTPATIENT
Start: 2024-05-20 | End: 2024-05-20

## 2024-05-20 RX ORDER — SODIUM CHLORIDE, SODIUM LACTATE, POTASSIUM CHLORIDE, CALCIUM CHLORIDE 600; 310; 30; 20 MG/100ML; MG/100ML; MG/100ML; MG/100ML
100 INJECTION, SOLUTION INTRAVENOUS CONTINUOUS
Status: DISCONTINUED | OUTPATIENT
Start: 2024-05-20 | End: 2024-05-21 | Stop reason: HOSPADM

## 2024-05-20 RX ORDER — HYDROMORPHONE HYDROCHLORIDE 1 MG/ML
0.5 INJECTION, SOLUTION INTRAMUSCULAR; INTRAVENOUS; SUBCUTANEOUS EVERY 5 MIN PRN
Status: DISCONTINUED | OUTPATIENT
Start: 2024-05-20 | End: 2024-05-21 | Stop reason: HOSPADM

## 2024-05-20 RX ORDER — AMOXICILLIN 250 MG
2 CAPSULE ORAL 2 TIMES DAILY
Qty: 56 TABLET | Refills: 0 | Status: SHIPPED | OUTPATIENT
Start: 2024-05-20 | End: 2024-06-03

## 2024-05-20 RX ORDER — HYDROMORPHONE HYDROCHLORIDE 1 MG/ML
INJECTION, SOLUTION INTRAMUSCULAR; INTRAVENOUS; SUBCUTANEOUS AS NEEDED
Status: DISCONTINUED | OUTPATIENT
Start: 2024-05-20 | End: 2024-05-20

## 2024-05-20 RX ORDER — CELECOXIB 200 MG/1
400 CAPSULE ORAL ONCE
Status: COMPLETED | OUTPATIENT
Start: 2024-05-20 | End: 2024-05-20

## 2024-05-20 RX ORDER — APREPITANT 40 MG/1
CAPSULE ORAL AS NEEDED
Status: DISCONTINUED | OUTPATIENT
Start: 2024-05-20 | End: 2024-05-20

## 2024-05-20 RX ORDER — SODIUM CHLORIDE, SODIUM LACTATE, POTASSIUM CHLORIDE, CALCIUM CHLORIDE 600; 310; 30; 20 MG/100ML; MG/100ML; MG/100ML; MG/100ML
INJECTION, SOLUTION INTRAVENOUS CONTINUOUS PRN
Status: DISCONTINUED | OUTPATIENT
Start: 2024-05-20 | End: 2024-05-20

## 2024-05-20 RX ORDER — MISOPROSTOL 200 UG/1
TABLET ORAL
Status: DISPENSED
Start: 2024-05-20 | End: 2024-05-21

## 2024-05-20 RX ORDER — HEPARIN SODIUM 5000 [USP'U]/ML
5000 INJECTION, SOLUTION INTRAVENOUS; SUBCUTANEOUS ONCE
Status: COMPLETED | OUTPATIENT
Start: 2024-05-20 | End: 2024-05-20

## 2024-05-20 RX ORDER — ACETAMINOPHEN 325 MG/1
975 TABLET ORAL ONCE
Status: COMPLETED | OUTPATIENT
Start: 2024-05-20 | End: 2024-05-20

## 2024-05-20 RX ORDER — OXYCODONE HYDROCHLORIDE 5 MG/1
10 TABLET ORAL EVERY 4 HOURS PRN
Status: DISCONTINUED | OUTPATIENT
Start: 2024-05-20 | End: 2024-05-21 | Stop reason: HOSPADM

## 2024-05-20 RX ORDER — ACETAMINOPHEN 500 MG
1000 TABLET ORAL EVERY 6 HOURS
Qty: 112 TABLET | Refills: 0 | Status: SHIPPED | OUTPATIENT
Start: 2024-05-20 | End: 2024-06-03

## 2024-05-20 RX ORDER — MIDAZOLAM HYDROCHLORIDE 1 MG/ML
INJECTION INTRAMUSCULAR; INTRAVENOUS AS NEEDED
Status: DISCONTINUED | OUTPATIENT
Start: 2024-05-20 | End: 2024-05-20

## 2024-05-20 RX ORDER — OXYCODONE HYDROCHLORIDE 5 MG/1
5 TABLET ORAL EVERY 6 HOURS PRN
Qty: 10 TABLET | Refills: 0 | Status: SHIPPED | OUTPATIENT
Start: 2024-05-20 | End: 2024-06-20 | Stop reason: WASHOUT

## 2024-05-20 RX ORDER — HYDROMORPHONE HYDROCHLORIDE 1 MG/ML
0.2 INJECTION, SOLUTION INTRAMUSCULAR; INTRAVENOUS; SUBCUTANEOUS EVERY 5 MIN PRN
Status: DISCONTINUED | OUTPATIENT
Start: 2024-05-20 | End: 2024-05-21 | Stop reason: HOSPADM

## 2024-05-20 RX ORDER — CEFAZOLIN 1 G/1
INJECTION, POWDER, FOR SOLUTION INTRAVENOUS AS NEEDED
Status: DISCONTINUED | OUTPATIENT
Start: 2024-05-20 | End: 2024-05-20

## 2024-05-20 RX ORDER — GABAPENTIN 600 MG/1
600 TABLET ORAL ONCE
Status: COMPLETED | OUTPATIENT
Start: 2024-05-20 | End: 2024-05-20

## 2024-05-20 RX ORDER — METRONIDAZOLE 500 MG/100ML
INJECTION, SOLUTION INTRAVENOUS AS NEEDED
Status: DISCONTINUED | OUTPATIENT
Start: 2024-05-20 | End: 2024-05-20

## 2024-05-20 RX ORDER — HYDRALAZINE HYDROCHLORIDE 20 MG/ML
5 INJECTION INTRAMUSCULAR; INTRAVENOUS EVERY 30 MIN PRN
Status: DISCONTINUED | OUTPATIENT
Start: 2024-05-20 | End: 2024-05-21 | Stop reason: HOSPADM

## 2024-05-20 RX ORDER — IBUPROFEN 600 MG/1
600 TABLET ORAL EVERY 6 HOURS
Qty: 56 TABLET | Refills: 0 | Status: SHIPPED | OUTPATIENT
Start: 2024-05-20 | End: 2024-06-03

## 2024-05-20 RX ORDER — KETOROLAC TROMETHAMINE 30 MG/ML
INJECTION, SOLUTION INTRAMUSCULAR; INTRAVENOUS AS NEEDED
Status: DISCONTINUED | OUTPATIENT
Start: 2024-05-20 | End: 2024-05-20

## 2024-05-20 RX ORDER — FENTANYL CITRATE 50 UG/ML
INJECTION, SOLUTION INTRAMUSCULAR; INTRAVENOUS AS NEEDED
Status: DISCONTINUED | OUTPATIENT
Start: 2024-05-20 | End: 2024-05-20

## 2024-05-20 RX ORDER — ROCURONIUM BROMIDE 10 MG/ML
INJECTION, SOLUTION INTRAVENOUS AS NEEDED
Status: DISCONTINUED | OUTPATIENT
Start: 2024-05-20 | End: 2024-05-20

## 2024-05-20 RX ORDER — ONDANSETRON 4 MG/1
4 TABLET, FILM COATED ORAL EVERY 4 HOURS PRN
Qty: 10 TABLET | Refills: 3 | Status: SHIPPED | OUTPATIENT
Start: 2024-05-20 | End: 2024-06-03

## 2024-05-20 RX ORDER — OXYCODONE HYDROCHLORIDE 5 MG/1
5 TABLET ORAL EVERY 4 HOURS PRN
Status: DISCONTINUED | OUTPATIENT
Start: 2024-05-20 | End: 2024-05-21 | Stop reason: HOSPADM

## 2024-05-20 RX ADMIN — ONDANSETRON 4 MG: 2 INJECTION, SOLUTION INTRAMUSCULAR; INTRAVENOUS at 22:35

## 2024-05-20 RX ADMIN — SODIUM CHLORIDE, POTASSIUM CHLORIDE, SODIUM LACTATE AND CALCIUM CHLORIDE: 600; 310; 30; 20 INJECTION, SOLUTION INTRAVENOUS at 15:32

## 2024-05-20 RX ADMIN — PROPOFOL 200 MG: 10 INJECTION, EMULSION INTRAVENOUS at 16:02

## 2024-05-20 RX ADMIN — Medication 6 L/MIN: at 22:45

## 2024-05-20 RX ADMIN — ROCURONIUM 20 MG: 50 INJECTION, SOLUTION INTRAVENOUS at 20:52

## 2024-05-20 RX ADMIN — SUGAMMADEX 400 MG: 100 INJECTION, SOLUTION INTRAVENOUS at 22:35

## 2024-05-20 RX ADMIN — SODIUM CHLORIDE, POTASSIUM CHLORIDE, SODIUM LACTATE AND CALCIUM CHLORIDE 100 ML/HR: 600; 310; 30; 20 INJECTION, SOLUTION INTRAVENOUS at 22:45

## 2024-05-20 RX ADMIN — ROCURONIUM 20 MG: 50 INJECTION, SOLUTION INTRAVENOUS at 16:35

## 2024-05-20 RX ADMIN — KETOROLAC TROMETHAMINE 30 MG: 30 INJECTION, SOLUTION INTRAMUSCULAR; INTRAVENOUS at 21:55

## 2024-05-20 RX ADMIN — ROCURONIUM 30 MG: 50 INJECTION, SOLUTION INTRAVENOUS at 18:03

## 2024-05-20 RX ADMIN — FENTANYL CITRATE 50 MCG: 50 INJECTION, SOLUTION INTRAMUSCULAR; INTRAVENOUS at 16:02

## 2024-05-20 RX ADMIN — ROCURONIUM 50 MG: 50 INJECTION, SOLUTION INTRAVENOUS at 16:02

## 2024-05-20 RX ADMIN — FENTANYL CITRATE 50 MCG: 50 INJECTION, SOLUTION INTRAMUSCULAR; INTRAVENOUS at 18:36

## 2024-05-20 RX ADMIN — ROCURONIUM 20 MG: 50 INJECTION, SOLUTION INTRAVENOUS at 19:59

## 2024-05-20 RX ADMIN — METRONIDAZOLE 500 MG: 5 INJECTION, SOLUTION INTRAVENOUS at 16:24

## 2024-05-20 RX ADMIN — ONDANSETRON 4 MG: 2 INJECTION, SOLUTION INTRAMUSCULAR; INTRAVENOUS at 22:25

## 2024-05-20 RX ADMIN — CEFAZOLIN 3 G: 1 INJECTION, POWDER, FOR SOLUTION INTRAMUSCULAR; INTRAVENOUS at 16:15

## 2024-05-20 RX ADMIN — HEPARIN SODIUM 5000 UNITS: 5000 INJECTION INTRAVENOUS; SUBCUTANEOUS at 10:12

## 2024-05-20 RX ADMIN — DEXAMETHASONE SODIUM PHOSPHATE 8 MG: 4 INJECTION INTRA-ARTICULAR; INTRALESIONAL; INTRAMUSCULAR; INTRAVENOUS; SOFT TISSUE at 16:04

## 2024-05-20 RX ADMIN — LIDOCAINE HYDROCHLORIDE 100 MG: 20 INJECTION, SOLUTION INFILTRATION; PERINEURAL at 16:02

## 2024-05-20 RX ADMIN — GABAPENTIN 600 MG: 600 TABLET, FILM COATED ORAL at 10:12

## 2024-05-20 RX ADMIN — ACETAMINOPHEN 975 MG: 325 TABLET ORAL at 10:12

## 2024-05-20 RX ADMIN — CELECOXIB 400 MG: 200 CAPSULE ORAL at 10:11

## 2024-05-20 RX ADMIN — ROCURONIUM 20 MG: 50 INJECTION, SOLUTION INTRAVENOUS at 17:29

## 2024-05-20 RX ADMIN — CEFAZOLIN 3 G: 1 INJECTION, POWDER, FOR SOLUTION INTRAMUSCULAR; INTRAVENOUS at 20:16

## 2024-05-20 RX ADMIN — MIDAZOLAM HYDROCHLORIDE 2 MG: 1 INJECTION, SOLUTION INTRAMUSCULAR; INTRAVENOUS at 15:30

## 2024-05-20 RX ADMIN — APREPITANT 40 MG: 40 CAPSULE ORAL at 15:05

## 2024-05-20 RX ADMIN — HYDROMORPHONE HYDROCHLORIDE 1 MG: 1 INJECTION, SOLUTION INTRAMUSCULAR; INTRAVENOUS; SUBCUTANEOUS at 18:50

## 2024-05-20 ASSESSMENT — PAIN - FUNCTIONAL ASSESSMENT
PAIN_FUNCTIONAL_ASSESSMENT: 0-10

## 2024-05-20 ASSESSMENT — PAIN SCALES - GENERAL
PAINLEVEL_OUTOF10: 2
PAINLEVEL_OUTOF10: 6
PAINLEVEL_OUTOF10: 0 - NO PAIN
PAIN_LEVEL: 0
PAINLEVEL_OUTOF10: 6
PAINLEVEL_OUTOF10: 0 - NO PAIN
PAINLEVEL_OUTOF10: 0 - NO PAIN

## 2024-05-20 ASSESSMENT — COLUMBIA-SUICIDE SEVERITY RATING SCALE - C-SSRS
1. IN THE PAST MONTH, HAVE YOU WISHED YOU WERE DEAD OR WISHED YOU COULD GO TO SLEEP AND NOT WAKE UP?: NO
2. HAVE YOU ACTUALLY HAD ANY THOUGHTS OF KILLING YOURSELF?: NO
6. HAVE YOU EVER DONE ANYTHING, STARTED TO DO ANYTHING, OR PREPARED TO DO ANYTHING TO END YOUR LIFE?: NO

## 2024-05-20 NOTE — ANESTHESIA PROCEDURE NOTES
Airway  Date/Time: 5/20/2024 4:12 PM  Urgency: elective    Airway not difficult    Staffing  Performed: resident   Authorized by: Baltazar Grider MD    Performed by: Lula Mejia DO  Patient location during procedure: OR    Indications and Patient Condition  Indications for airway management: anesthesia  Spontaneous ventilation: present  Sedation level: deep  Preoxygenated: yes  Patient position: sniffing  Mask difficulty assessment: 2 - vent by mask + OA or adjuvant +/- NMBA  Planned trial extubation    Final Airway Details  Final airway type: endotracheal airway      Successful airway: ETT  Cuffed: yes   Successful intubation technique: video laryngoscopy  Facilitating devices/methods: intubating stylet  Endotracheal tube insertion site: oral  Blade: Glenn  Blade size: #3  ETT size (mm): 7.0  Cormack-Lehane Classification: grade I - full view of glottis  Placement verified by: bronchoscopy and capnometry   Measured from: teeth  ETT to teeth (cm): 21  Number of attempts at approach: 1    Additional Comments  First attempt  by MS4 student, no EtCO2 on ventilator, esophageal intubation  Second attempt by PGY2 resident, positive EtCO2 and bilateral breath sounds

## 2024-05-20 NOTE — ANESTHESIA PREPROCEDURE EVALUATION
"Patient: Khadijah Shaw \"Yessica\"    Procedure Information       Date/Time: 05/20/24 1115    Procedure: Total robotic hysterectomy, bilateral salpingectomy, any indicated procedure (Bilateral: Pelvis)    Location: [facility] OR 05 / Virtual [facility]    Surgeons: Margaret Nunn MD            Relevant Problems   GYN   (+) Abnormal uterine bleeding (AUB)   (+) Intramural, submucous, and subserous leiomyoma of uterus       Clinical information reviewed:   Tobacco  Allergies  Meds   Med Hx  Surg Hx   Fam Hx  Soc Hx        NPO Detail:  NPO/Void Status  Carbohydrate Drink Given Prior to Surgery? : N  Date of Last Liquid: 05/20/24  Time of Last Liquid: 0000  Date of Last Solid: 05/20/24  Time of Last Solid: 0000  Last Intake Type: Clear fluids  Time of Last Void: 1000         Physical Exam    Airway  Mallampati: III  TM distance: >3 FB  Neck ROM: limited  Comments: Can bite upper lip; a little decreased neck motion   Cardiovascular   Rhythm: regular  Rate: normal     Dental    Pulmonary    Abdominal          Anesthesia Plan    History of general anesthesia?: yes  History of complications of general anesthesia?: yes    ASA 3     general   (Pt had sore throat after previous procedure)  intravenous induction   Anesthetic plan and risks discussed with patient.    Plan discussed with attending and resident.    "

## 2024-05-20 NOTE — ANESTHESIA PROCEDURE NOTES
Peripheral IV  Date/Time: 5/20/2024 5:04 PM      Placement  Needle size: 16 G  Laterality: right  Location: hand  Site prep: alcohol  Technique: anatomical landmarks  Attempts: 1

## 2024-05-20 NOTE — H&P
History Of Present Illness  Yessica Shaw is a 33 y.o. female presenting for total robotic hysterectomy, bilateral salpingectomy any other indicated procedure in the setting of fibroid uterus and AUB, dysmenorrhea.     Imaging:  MRI 2/2024  IMPRESSION:  Markedly enlarged uterus secondary to a large, enhancing  multilobulated anterior intramural uterine leiomyoma and additional  smaller enhancing subserosal and intramural uterine leiomyomas as  detailed above. Note evaluation of the junctional zone and  endometrium are poorly visualized due to mass effect from the  above-mentioned leiomyomas.    Pre op labs: Hgb 13.6, plts 274, Cr 0.63     Past Medical History  Past Medical History:   Diagnosis Date    Anxiety     BMI 45.0-49.9, adult (Multi)     Depression     Dysfunctional uterine bleeding     Fractures     age 3    GERD (gastroesophageal reflux disease)     Hard to intubate     Pt reports being told she was difficult to intubate during procedure 3/26/2024 but anesthesia note from 3/26/2024 does not state that    Leiomyoma     uterine    Rosacea     Uterine leiomyoma     Vitamin D deficiency        Surgical History  Past Surgical History:   Procedure Laterality Date    COLPOSCOPY      sampling of endometrium, EUA    FOOT SURGERY      wart removal    FRACTURE SURGERY      arm as child        Social History  She reports that she has never smoked. She has never used smokeless tobacco. She reports current alcohol use. She reports that she does not use drugs.    Family History  Family History   Problem Relation Name Age of Onset    Hypertension Mother      Breast cancer Mother      Thyroid cancer Mother      Hypertension Father      Skin cancer Father      Hypertension Brother          Allergies  Animal dander and Bee pollen    Review of Systems     Physical Exam   Gen: well appearing, no distress  HEENT: normocephalic   Pulm: normal work of breathing on room air  CV: warm, well perfused  GI: abdomen soft,  "non tender, non distended  : deferred to OR  MSK: normal range of motion  Ext: no LE edema    Last Recorded Vitals  Blood pressure 143/90, pulse 86, temperature 36.2 °C (97.2 °F), temperature source Temporal, resp. rate 16, height 1.549 m (5' 1\"), weight 124 kg (273 lb 5.9 oz), SpO2 100%.          Lily Alfaro MD    "

## 2024-05-21 ENCOUNTER — APPOINTMENT (OUTPATIENT)
Dept: RADIOLOGY | Facility: HOSPITAL | Age: 34
End: 2024-05-21
Payer: COMMERCIAL

## 2024-05-21 VITALS
SYSTOLIC BLOOD PRESSURE: 109 MMHG | HEART RATE: 66 BPM | RESPIRATION RATE: 16 BRPM | TEMPERATURE: 98.2 F | BODY MASS INDEX: 51.61 KG/M2 | OXYGEN SATURATION: 96 % | DIASTOLIC BLOOD PRESSURE: 66 MMHG | HEIGHT: 61 IN | WEIGHT: 273.37 LBS

## 2024-05-21 PROBLEM — Z90.710 STATUS POST HYSTERECTOMY: Status: ACTIVE | Noted: 2024-05-21

## 2024-05-21 LAB
ANION GAP SERPL CALC-SCNC: 12 MMOL/L (ref 10–20)
BUN SERPL-MCNC: 12 MG/DL (ref 6–23)
CALCIUM SERPL-MCNC: 8.8 MG/DL (ref 8.6–10.6)
CHLORIDE SERPL-SCNC: 107 MMOL/L (ref 98–107)
CO2 SERPL-SCNC: 26 MMOL/L (ref 21–32)
CREAT SERPL-MCNC: 0.69 MG/DL (ref 0.5–1.05)
EGFRCR SERPLBLD CKD-EPI 2021: >90 ML/MIN/1.73M*2
ERYTHROCYTE [DISTWIDTH] IN BLOOD BY AUTOMATED COUNT: 13.2 % (ref 11.5–14.5)
GLUCOSE SERPL-MCNC: 127 MG/DL (ref 74–99)
HCT VFR BLD AUTO: 37 % (ref 36–46)
HGB BLD-MCNC: 12 G/DL (ref 12–16)
MAGNESIUM SERPL-MCNC: 1.9 MG/DL (ref 1.6–2.4)
MCH RBC QN AUTO: 28.5 PG (ref 26–34)
MCHC RBC AUTO-ENTMCNC: 32.4 G/DL (ref 32–36)
MCV RBC AUTO: 88 FL (ref 80–100)
NRBC BLD-RTO: 0 /100 WBCS (ref 0–0)
PLATELET # BLD AUTO: 249 X10*3/UL (ref 150–450)
POTASSIUM SERPL-SCNC: 4.4 MMOL/L (ref 3.5–5.3)
RBC # BLD AUTO: 4.21 X10*6/UL (ref 4–5.2)
SODIUM SERPL-SCNC: 141 MMOL/L (ref 136–145)
WBC # BLD AUTO: 12.9 X10*3/UL (ref 4.4–11.3)

## 2024-05-21 PROCEDURE — 96372 THER/PROPH/DIAG INJ SC/IM: CPT

## 2024-05-21 PROCEDURE — G0378 HOSPITAL OBSERVATION PER HR: HCPCS

## 2024-05-21 PROCEDURE — 2500000005 HC RX 250 GENERAL PHARMACY W/O HCPCS

## 2024-05-21 PROCEDURE — 2500000004 HC RX 250 GENERAL PHARMACY W/ HCPCS (ALT 636 FOR OP/ED)

## 2024-05-21 PROCEDURE — 2500000001 HC RX 250 WO HCPCS SELF ADMINISTERED DRUGS (ALT 637 FOR MEDICARE OP)

## 2024-05-21 PROCEDURE — 36415 COLL VENOUS BLD VENIPUNCTURE: CPT

## 2024-05-21 PROCEDURE — 85027 COMPLETE CBC AUTOMATED: CPT

## 2024-05-21 PROCEDURE — 71046 X-RAY EXAM CHEST 2 VIEWS: CPT

## 2024-05-21 PROCEDURE — 71046 X-RAY EXAM CHEST 2 VIEWS: CPT | Performed by: RADIOLOGY

## 2024-05-21 PROCEDURE — 83735 ASSAY OF MAGNESIUM: CPT

## 2024-05-21 PROCEDURE — 7100000011 HC EXTENDED STAY RECOVERY HOURLY - NURSING UNIT

## 2024-05-21 PROCEDURE — 80048 BASIC METABOLIC PNL TOTAL CA: CPT

## 2024-05-21 RX ORDER — SIMETHICONE 80 MG
80 TABLET,CHEWABLE ORAL 4 TIMES DAILY PRN
Status: DISCONTINUED | OUTPATIENT
Start: 2024-05-21 | End: 2024-05-21 | Stop reason: HOSPADM

## 2024-05-21 RX ORDER — TRAMADOL HYDROCHLORIDE 50 MG/1
50 TABLET ORAL EVERY 6 HOURS PRN
Status: DISCONTINUED | OUTPATIENT
Start: 2024-05-21 | End: 2024-05-21 | Stop reason: HOSPADM

## 2024-05-21 RX ORDER — ACETAMINOPHEN 325 MG/1
975 TABLET ORAL EVERY 6 HOURS
Status: DISCONTINUED | OUTPATIENT
Start: 2024-05-21 | End: 2024-05-21 | Stop reason: HOSPADM

## 2024-05-21 RX ORDER — IBUPROFEN 600 MG/1
600 TABLET ORAL EVERY 6 HOURS
Status: DISCONTINUED | OUTPATIENT
Start: 2024-05-22 | End: 2024-05-21 | Stop reason: HOSPADM

## 2024-05-21 RX ORDER — ESCITALOPRAM OXALATE 10 MG/1
10 TABLET ORAL DAILY
Status: DISCONTINUED | OUTPATIENT
Start: 2024-05-21 | End: 2024-05-21 | Stop reason: HOSPADM

## 2024-05-21 RX ORDER — KETOROLAC TROMETHAMINE 30 MG/ML
30 INJECTION, SOLUTION INTRAMUSCULAR; INTRAVENOUS EVERY 6 HOURS
Status: DISCONTINUED | OUTPATIENT
Start: 2024-05-21 | End: 2024-05-21 | Stop reason: HOSPADM

## 2024-05-21 RX ORDER — AMOXICILLIN 250 MG
2 CAPSULE ORAL 2 TIMES DAILY
Status: DISCONTINUED | OUTPATIENT
Start: 2024-05-21 | End: 2024-05-21 | Stop reason: HOSPADM

## 2024-05-21 RX ORDER — HEPARIN SODIUM 5000 [USP'U]/ML
7500 INJECTION, SOLUTION INTRAVENOUS; SUBCUTANEOUS EVERY 8 HOURS SCHEDULED
Status: DISCONTINUED | OUTPATIENT
Start: 2024-05-21 | End: 2024-05-21 | Stop reason: HOSPADM

## 2024-05-21 RX ORDER — NALOXONE HYDROCHLORIDE 0.4 MG/ML
0.1 INJECTION, SOLUTION INTRAMUSCULAR; INTRAVENOUS; SUBCUTANEOUS EVERY 5 MIN PRN
Status: DISCONTINUED | OUTPATIENT
Start: 2024-05-21 | End: 2024-05-21 | Stop reason: HOSPADM

## 2024-05-21 RX ORDER — ONDANSETRON HYDROCHLORIDE 2 MG/ML
4 INJECTION, SOLUTION INTRAVENOUS EVERY 6 HOURS PRN
Status: DISCONTINUED | OUTPATIENT
Start: 2024-05-21 | End: 2024-05-21 | Stop reason: HOSPADM

## 2024-05-21 RX ORDER — TRAMADOL HYDROCHLORIDE 50 MG/1
100 TABLET ORAL EVERY 6 HOURS PRN
Status: DISCONTINUED | OUTPATIENT
Start: 2024-05-21 | End: 2024-05-21 | Stop reason: HOSPADM

## 2024-05-21 RX ORDER — SODIUM CHLORIDE, SODIUM LACTATE, POTASSIUM CHLORIDE, CALCIUM CHLORIDE 600; 310; 30; 20 MG/100ML; MG/100ML; MG/100ML; MG/100ML
40 INJECTION, SOLUTION INTRAVENOUS CONTINUOUS
Status: DISCONTINUED | OUTPATIENT
Start: 2024-05-21 | End: 2024-05-21 | Stop reason: HOSPADM

## 2024-05-21 RX ADMIN — HEPARIN SODIUM 7500 UNITS: 5000 INJECTION INTRAVENOUS; SUBCUTANEOUS at 06:26

## 2024-05-21 RX ADMIN — ACETAMINOPHEN 975 MG: 325 TABLET ORAL at 15:42

## 2024-05-21 RX ADMIN — Medication 2 L/MIN: at 02:20

## 2024-05-21 RX ADMIN — HYDRALAZINE HYDROCHLORIDE 5 MG: 20 INJECTION INTRAMUSCULAR; INTRAVENOUS at 01:07

## 2024-05-21 RX ADMIN — Medication 2 L/MIN: at 08:00

## 2024-05-21 RX ADMIN — ACETAMINOPHEN 975 MG: 325 TABLET ORAL at 09:26

## 2024-05-21 RX ADMIN — TRAMADOL HYDROCHLORIDE 100 MG: 50 TABLET, COATED ORAL at 02:36

## 2024-05-21 RX ADMIN — KETOROLAC TROMETHAMINE 30 MG: 30 INJECTION, SOLUTION INTRAMUSCULAR at 03:30

## 2024-05-21 RX ADMIN — Medication 3 L/MIN: at 03:30

## 2024-05-21 RX ADMIN — ACETAMINOPHEN 975 MG: 325 TABLET ORAL at 03:30

## 2024-05-21 RX ADMIN — KETOROLAC TROMETHAMINE 30 MG: 30 INJECTION, SOLUTION INTRAMUSCULAR at 10:09

## 2024-05-21 RX ADMIN — SENNOSIDES AND DOCUSATE SODIUM 2 TABLET: 8.6; 5 TABLET ORAL at 09:26

## 2024-05-21 RX ADMIN — ESCITALOPRAM OXALATE 10 MG: 10 TABLET ORAL at 09:26

## 2024-05-21 RX ADMIN — Medication 2 L/MIN: at 00:15

## 2024-05-21 RX ADMIN — HEPARIN SODIUM 7500 UNITS: 5000 INJECTION INTRAVENOUS; SUBCUTANEOUS at 14:26

## 2024-05-21 ASSESSMENT — PAIN SCALES - GENERAL
PAINLEVEL_OUTOF10: 5 - MODERATE PAIN
PAINLEVEL_OUTOF10: 7
PAINLEVEL_OUTOF10: 0 - NO PAIN
PAINLEVEL_OUTOF10: 6
PAINLEVEL_OUTOF10: 5 - MODERATE PAIN
PAINLEVEL_OUTOF10: 3
PAINLEVEL_OUTOF10: 6

## 2024-05-21 ASSESSMENT — PAIN DESCRIPTION - LOCATION: LOCATION: ABDOMEN

## 2024-05-21 ASSESSMENT — PAIN - FUNCTIONAL ASSESSMENT
PAIN_FUNCTIONAL_ASSESSMENT: 0-10

## 2024-05-21 ASSESSMENT — PAIN DESCRIPTION - DESCRIPTORS: DESCRIPTORS: CRAMPING;SORE

## 2024-05-21 NOTE — ANESTHESIA POSTPROCEDURE EVALUATION
"Patient: Khadijah Shaw \"Yessica\"    Procedure Summary       Date: 05/20/24 Room / Location: [facility] 05 / [facility]    Anesthesia Start: 1548 Anesthesia Stop: 2250    Procedure: Total robotic hysterectomy, bilateral salpingectomy, cystoscopy (Bilateral: Pelvis) Diagnosis:       Intramural, submucous, and subserous leiomyoma of uterus      (Intramural, submucous, and subserous leiomyoma of uterus [D25.1, D25.0, D25.2])    Surgeons: Margaret Nunn MD Responsible Provider: Elis Lazar MD    Anesthesia Type: general ASA Status: 3            Anesthesia Type: general    Vitals Value Taken Time   /71 05/20/24 2246   Temp 36.2 05/20/24 2250   Pulse 91 05/20/24 2248   Resp 20 05/20/24 2248   SpO2 96 % 05/20/24 2248   Vitals shown include unfiled device data.    Anesthesia Post Evaluation    Patient location during evaluation: PACU  Patient participation: complete - patient participated  Level of consciousness: awake and alert  Pain score: 0  Pain management: satisfactory to patient  Multimodal analgesia pain management approach  Airway patency: patent  Two or more strategies used to mitigate risk of obstructive sleep apnea  Cardiovascular status: acceptable and blood pressure returned to baseline  Respiratory status: acceptable  Hydration status: acceptable  Postoperative Nausea and Vomiting: none        No notable events documented.    "

## 2024-05-21 NOTE — CARE PLAN
The patient's goals for the shift include rest    The clinical goals for the shift include pain control and SPO2 > 92%    Over the shift, the patient did make progress with her SPO2 staying above 92%.   Problem: Pain  Goal: My pain/discomfort is manageable  Outcome: Adequate for Discharge     Problem: Fall/Injury  Goal: Be free from injury by end of the shift  Outcome: Adequate for Discharge  Goal: Verbalize understanding of personal risk factors for fall in the hospital  Outcome: Adequate for Discharge

## 2024-05-21 NOTE — DISCHARGE SUMMARY
Discharge Diagnosis  Intramural, submucous, and subserous leiomyoma of uterus      Test Results Pending At Discharge  Pending Labs       Order Current Status    POCT pregnancy, urine manually resulted In process    Surgical Pathology Exam In process            Hospital Course   Patient was admitted on 5/20 for scheduled surgery. She underwent a robotic assisted TLH, BS, bilateral uterolysis . Her procedure was uncomplicated, see the operative report for full details. She had an O2 requirement overnight likely in setting of WES/atelectasis. By POD#1, she was meeting all postoperative milestones including: tolerating PO diet and medications, voiding, ambulating, satting appropriately on room air. Her pain was well controlled with PO pain medications. She was appropriate for discharge home and will follow up as an outpatient in 2-4 weeks with Dr. Nunn.      Pertinent Physical Exam At Time of Discharge  Physical Exam  Vitals reviewed.     Constitutional:       Appearance: Normal appearance.   HENT:      Head: Normocephalic and atraumatic.   Cardiovascular:      Rate and Rhythm: Normal rate.   Pulmonary:      Effort: Pulmonary effort is normal.    Abdominal:      General: Abdomen is flat.   Skin:     General: Skin is warm and dry.   Neurological:      Mental Status: She is alert.   Psychiatric:         Mood and Affect: Mood normal.         Behavior: Behavior normal.          Home Medications     Medication List      START taking these medications     acetaminophen 500 mg tablet; Commonly known as: Tylenol; Take 2 tablets   (1,000 mg) by mouth every 6 hours for 14 days.   ibuprofen 600 mg tablet; Take 1 tablet (600 mg) by mouth every 6 hours   for 14 days.   ondansetron 4 mg tablet; Commonly known as: Zofran; Take 1 tablet (4 mg)   by mouth every 4 hours if needed for nausea or vomiting for up to 14 days.   oxyCODONE 5 mg immediate release tablet; Commonly known as: Roxicodone;   Take 1 tablet (5 mg) by mouth every 6  hours if needed for severe pain (7 -   10).   sennosides-docusate sodium 8.6-50 mg tablet; Commonly known as:   Briana-Colace; Take 2 tablets by mouth 2 times a day for 14 days. Until   first bowel movement and then as needed for constipation     CONTINUE taking these medications     escitalopram 10 mg tablet; Commonly known as: Lexapro   Vitamin D3 25 MCG (1000 UT) capsule; Generic drug: cholecalciferol   ZyrTEC 10 mg tablet; Generic drug: cetirizine     STOP taking these medications     leuprolide (3-month) 11.25 mg injection; Commonly known as: Lupron Depot       Outpatient Follow-Up  No future appointments.    Irish Gamboa MD

## 2024-05-21 NOTE — PROGRESS NOTES
"Yessica Shaw is a 33 y.o. female on day 0 of admission presenting with Intramural, submucous, and subserous leiomyoma of uterus.    Subjective   Pt is doing okay. Reports pain is controlled when lying still. Had nausea in PACU that has since resolved after receiving Phernegan. Tolerating sips of water. Denies vaginal bleeding. Ambulating and spontaneously voiding. Patient was admitted overnight for observation for post-operative hypoxia satting 88-91% SpO2 requiring 3L NC. Patient denies WES workup but notices increased work of breathing while sleeping as she snores. Denies CP and SOB.        Objective     Physical Exam  Constitutional:       Appearance: Normal appearance.   HENT:      Head: Normocephalic and atraumatic.   Cardiovascular:      Rate and Rhythm: Normal rate.   Pulmonary:      Effort: Pulmonary effort is normal.      Comments: Satting 94-95% SpO2 on 3L NC   Abdominal:      General: Abdomen is flat.   Skin:     General: Skin is warm and dry.   Neurological:      Mental Status: She is alert.   Psychiatric:         Mood and Affect: Mood normal.         Behavior: Behavior normal.         Last Recorded Vitals  Blood pressure 139/68, pulse 66, temperature 36.5 °C (97.7 °F), temperature source Temporal, resp. rate 10, height 1.549 m (5' 1\"), weight 124 kg (273 lb 5.9 oz), SpO2 94%.  Intake/Output last 3 Shifts:  I/O last 3 completed shifts:  In: - (0 mL/kg)   Out: 100 (0.8 mL/kg) [Urine:100 (0 mL/kg/hr)]  Weight: 124 kg     Relevant Results  .  Results for orders placed or performed during the hospital encounter of 05/20/24 (from the past 24 hour(s))   POCT pregnancy, urine manually resulted   Result Value Ref Range    Preg Test, Ur Negative Negative   Verify ABO/Rh Group Test (VERAB)   Result Value Ref Range    ABO TYPE O     Rh TYPE POS        Imaging   STUDY:  XR CHEST 2 VIEWS;  5/21/2024 2:06 am      INDICATION:  Signs/Symptoms:acute hypoxia, post-operative.      COMPARISON:  None.    "   ACCESSION NUMBER(S):  [id number]      ORDERING CLINICIAN:  JOSEPH CHAHAL      FINDINGS:  PA and lateral radiographs of the chest were provided.  Additional PA  dual energy images were also provided.          CARDIOMEDIASTINAL SILHOUETTE:  Cardiomediastinal silhouette is normal in size and configuration.      LUNGS:  No consolidation, effusion, edema, or pneumothorax.      ABDOMEN:  No remarkable upper abdominal findings.      BONES:  No acute osseous changes.      IMPRESSION:  No evidence of acute cardiopulmonary process.      I personally reviewed the images/study and I agree with the findings  as stated by Emil Yu MD. This study was interpreted at  [place], [place].      MACRO:  None       Assessment/Plan   Principal Problem:    Intramural, submucous, and subserous leiomyoma of uterus  Active Problems:    Status post hysterectomy    Post-operative State  - Pain controlled on current regimen  - Mayo removed in OR, voiding spontaneously   - Regular diet, advance as tolerated  - Anti-emetics and bowel regimen ordered   - LR 40cc/hr for maintenance fluids  - Strict I/Os. UOP   - Encourage early ambulation, SCD and ppx Heparin for DVT ppx.   - Encourage incentive spirometry 10x/hr     Acute Hypoxia  -satting 88-90% SpO2 on RA in PACU  requiring 3LNC. Currently satting 95% SpO2 on 3LNC, hypoxia notably occurring while sleeping c/f possible WES vs post-operative atelactasis. No pleuritic chest pain noted and lungs CTAB, currently lower supicion for PE. Wean O2 as appropriate  - Cxray unremarkable    - If unable to wean or SpO2 requirements worsen, consider walk test and/or CT P/E     Acute Blood Loss Anemia   - Admission hgb 13.6-> -> AM hgb 12.0  - vitals wnl, asx; continue to monitor     Co-morbidities  Anxiety- home lexapro 10mg continued   Allergies- home cetrizine continued    Dispo: inpatient management until meeting post-operative milestones       To d/w AM Gyn team    Nena oLmas MD, Pgy-2     AM update: AM updates made within the text    To be seen and discussed with Dr. Edouard Gamboa MD PGY-1

## 2024-05-21 NOTE — OP NOTE
"Total robotic hysterectomy, bilateral salpingectomy, cystoscopy (B) Operative Note     Date: 2024  OR Location: [facility] OR    Name: Khadijah Shaw \"Yessica\", : 1990, Age: 33 y.o., MRN: [id number], Sex: female    Diagnosis  Pre-op Diagnosis     * Intramural, submucous, and subserous leiomyoma of uterus [D25.1, D25.0, D25.2] Post-op Diagnosis     * Intramural, submucous, and subserous leiomyoma of uterus [D25.1, D25.0, D25.2]     Procedures  Total robotic hysterectomy, bilateral salpingectomy, cystoscopy  38866 - DC LAPAROSCOPY TOTAL HYSTERECTOMY UTERUS >250 GM  Bilateral ureterolysis  Contained morcellation    Surgeons      * Margaret Nunn - Primary    Resident/Fellow/Other Assistant:  Surgeons and Role:     * Lily Alfaro MD - Assisting     * Nena Lomas MD - Resident - Assisting    Procedure Summary  Anesthesia: General  ASA: III  Anesthesia Staff: Anesthesiologist: Baltazar Grider MD; Vijay Hardy MD; Elis Lazar MD  CRNA: JOSE Howe-CRNA  Anesthesia Resident: Lula Mejia DO  Estimated Blood Loss: 100 mL  Intra-op Medications: * Intraprocedure medication information is unavailable because the case start and end events have not been set *           Anesthesia Record               Intraprocedure I/O Totals          Intake    LR infusion 2200.00 mL    Total Intake 2200 mL       Output    Urine 475 mL    Est. Blood Loss 100 mL    NG/OG Tube Output 175 mL    Total Output 750 mL       Net    Net Volume 1450 mL          Specimen:   ID Type Source Tests Collected by Time   1 : uterus, cervix and bilateral tubes Tissue UTERUS, CERVIX, AND BILATERAL FALLOPIAN TUBES SURGICAL PATHOLOGY EXAM Margaret Nunn MD 2024 [id number]        Staff:   Circulator: Cristian Ly RN  Relief Circulator: Lily Elkins RN  Relief Scrub: Brooke Graham  Scrub Person: Lamin Juan; Mal Rdz RN         Drains and/or Catheters:   [REMOVED] Urethral Catheter Non-latex 16 Fr. " (Removed)       Tourniquet Times:         Implants:     Findings: Excessively large fibroid uterus, filling entire pelvis w/ retroperitonealization of anterior uterus and bilateral round ligaments. Uterine manipulator could not be placed due to uterine size and distortion from fibroids. Grossly normal ovaries, Fallopian tubes, and upper abdominal anatomy. Uterine weight: 1350 grams.     Indications: Yessica Shaw is an 33 y.o. female who is having surgery for Intramural, submucous, and subserous leiomyoma of uterus [D25.1, D25.0, D25.2].     Description of Procedure  Patient was taken to the operating room where she was prepared and draped in the usual sterile fashion.  A Mayo catheter was placed. Attention was then turned abdominally.  The base of the umbilicus was elevated using Kocher clamps.  The skin was incised with a scalpel.  The fascia was grasped with Kochers and entered sharply using a scalpel.  Peritoneum was bluntly opened using a  clamp.  Intraperitoneal placement was confirmed via visualization of underlying bowel.     Long Perla trocar was then placed at the umbilical entry site. Diagnostic laparoscopy was performed, and revealed findings as noted above.  No areas of trauma or injury were noted immediately inferior to the umbilicus. Complete abdominal survey was performed. The patient was then placed in steep Trendelenburg positioning.     Ancillary trocars were then placed under direct visualization. Three robotic trocars were placed and one 8mm Airseal trocar. The robot was then docked.     Attention was turned to the pelvis. The overlying anterior peritoneum was opened, and dissected back until both round ligaments and the anterior portion of the uterus could be seen.      The right Fallopian tube and ovary were extracted from under the uterus. The right ureter was identified, and complete ureterolysis to the level of the uterine artery was performed. The right fallopian tube was  elevated away from the underlying structures.  The mesosalpinx was clamped, sealed, and transected using the Vessel Sealer device.  The fallopian tube was  to the level of the uterine cornua, transected, and removed from the abdomen.  The round ligament and utero-ovarian ligament were then clamped sealed and transected using the LigaSure device.  The broad ligament was then opened anteriorly and posteriorly to skeletonize the uterine vasculature.  The bladder flap was started using monopolar cautery.     Attention was then turned to the left side of the pelvis, which was dissected in a similar fashion.     An EA sizer was placed in the vagina. Monopolar electrosurgery was then utilized to complete the bladder flap.  The bladder was dissected away from the lower uterine segment and cervix using blunt dissection and monopolar electrosurgery. Bilateral uterine arteries were then further skeletonized, clamped, sealed, and transected. Colpotomy was then performed using monopolar electrosurgery.  The uterus was fully  from the vagina.    A ring forcep was then placed through the vagina and the cervix grasped w/ the ring. A 17cm [employer] bag was then introduced into the abdomen through the vagina, and the uterus and cervix placed into the bag. The opening of the bag was then brought out through the vagina, and an [employer] plastic ring placed w/ in the bag to protect the vagina. Contained morcellation was then performed until the uterus and cervix were completely removed from the abdomen.      The pelvis was inspected and noted be adequately hemostatic.  The vaginal cuff was then closed robotically using 0 V lock suture. Hemostasis was achieved using monopolar electrosurgery and Surgicell powder. All instruments were then removed from the abdomen and the robot undocked.      The umbilical fascia was then closed with 0 Vicryl suture.  The skin was closed with 4-0 Monocryl.        The Mayo  catheter was removed. Cystoscopy was performed and revealed intact bladder with bilateral ureteral jets and no evidence of trauma or foreign material.      The patient was awakened from general anesthesia and taken the recovery room in stable condition.      I was present and scrubbed for the entirety of the surgical procedure. This was procedure was substantially more complicated and required increase time and surgical expertise due to uterine size, fibroid location/consistency, and patient habitus.     Complications:  None; patient tolerated the procedure well.    Disposition: PACU - hemodynamically stable.  Condition: stable       Margaret Nunn  Phone Number: [phone]

## 2024-05-21 NOTE — DISCHARGE INSTRUCTIONS
Post-Operation Instructions  What care is needed at home?  Ask your doctor what you need to do when you go home. Make sure you ask questions if you do not understand what you need to do.  You can take off the bandaid covering your incision in 1-2 days.  You can shower, but do not scrub your incisions, let warm soapy water flow over them.  Do not lift things over 10 pounds (4.5 kg).  Do not put anything in your vagina until cleared by your provider: no sex, douching, tampons.  Be sure to wash your hands before and after touching your wound or dressing.  Your bowel movements may take some time to get back to normal. Eat small meals high in fiber to avoid hard stools. Drink 6 to 8 glasses of water each day.  Try to walk each day. Start by walking a little more than you did the day before. Walking boosts blood flow and helps prevent lung, belly, and blood problems.    What follow-up care is needed?  We will call you to schedule a follow up visit with your surgeon. Be sure to keep your visits.  You have stitches. They will dissolve on their own. The surgical glue will fall off on its own as well.    What drugs may be needed?  We are sending you home with ibuprofen, tylenol, oxycodone (for pain), and Miralax (a stool softener).     What problems could happen?  Infection  Wound opening  Heavy blood loss  Blood clots in your legs or lungs  Damage to your bowel, bladder, and other organs inside the belly  Trouble passing bowel movements    When do I need to call the doctor?  Signs of infection such as a fever of 100.4°F (38°C) or higher, chills, pain with passing urine.  Signs of wound infection such as swelling, redness, warmth around the wound; too much pain when touched; yellowish, greenish, or bloody discharge; foul smell coming from the cut site; cut site opens up.  Excessive blood in your sanitary pads or more than six soaked pads in a day. (Spotting is normal).  Smelly green or dark yellow vaginal discharge  Upset  stomach, throwing up, or very bad belly pain  Pain not helped by drugs you are taking  No bowel movement after 3 days  If you feel the need to pass urine but urine will not come out even after 6 hours  Swelling in your leg or arm that is much greater on one side than the other    Follow up with Dr. Nunn in 2-4 weeks

## 2024-06-16 NOTE — PROGRESS NOTES
"Division of Minimally Invasive Gynecologic Surgery  [facility]    06/16/24 Gynecology Visit    Khadijah Shaw is a 33 y.o. status post TRH-BS presents for post op check.     Overall recovering well, meeting all milestones.     PMHx, PSHx, SHx, Allergies, and Medications updated in Epic.    ROS: reviewed and negative    PE: /84   Pulse 83   Ht 1.549 m (5' 1\")   Wt 122 kg (269 lb)   LMP 04/16/2024   BMI 50.83 kg/m²    Constitutional:  No acute distress  HEENT: EOM grossly intact, MMM, neck supple and with full ROM  Pulm:  Effort normal. No accessory muscle usage.  No respiratory distress.  :  - EGBUS: grossly WNL  - Speculum: vaginal mucosa grossly WNL, no trauma or lesions, cuff intact w/o laxity   Neurological:  AAO x 3, appropriate to interview  Skin: Warm, no pallor.  Psychiatric:  normal mood and affect.    Assessment/Plan:     Khadijah Shaw is a 33 y.o. status post TRH-BS presents for post op check.   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 Discussed this finding at length, including risk of recurrence.   - I have reached out to the GYN Oncology team to inquire about additional recommendations, such as surveillance. Patient would also like to meet with GYN Oncologist to review findings. Scheduling team emailed.   - Continue post op restrictions until 6 weeks after surgery, reviewed these today    Margaret Nunn MD  Division of Minimally Invasive Gynecologic Surgery  [facility]    "

## 2024-06-18 LAB
LAB AP ASR DISCLAIMER: NORMAL
LABORATORY COMMENT REPORT: NORMAL
PATH REPORT.COMMENTS IMP SPEC: NORMAL
PATH REPORT.FINAL DX SPEC: NORMAL
PATH REPORT.GROSS SPEC: NORMAL
PATH REPORT.RELEVANT HX SPEC: NORMAL
PATH REPORT.TOTAL CANCER: NORMAL

## 2024-06-18 PROCEDURE — 88341 IMHCHEM/IMCYTCHM EA ADD ANTB: CPT | Performed by: PATHOLOGY

## 2024-06-18 PROCEDURE — 88342 IMHCHEM/IMCYTCHM 1ST ANTB: CPT | Performed by: PATHOLOGY

## 2024-06-18 PROCEDURE — 88313 SPECIAL STAINS GROUP 2: CPT | Performed by: PATHOLOGY

## 2024-06-18 PROCEDURE — 88313 SPECIAL STAINS GROUP 2: CPT | Mod: TC,SUR,PARLAB,WESLAB | Performed by: STUDENT IN AN ORGANIZED HEALTH CARE EDUCATION/TRAINING PROGRAM

## 2024-06-20 ENCOUNTER — OFFICE VISIT (OUTPATIENT)
Dept: OBSTETRICS AND GYNECOLOGY | Facility: CLINIC | Age: 34
End: 2024-06-20
Payer: COMMERCIAL

## 2024-06-20 VITALS
BODY MASS INDEX: 50.79 KG/M2 | HEIGHT: 61 IN | DIASTOLIC BLOOD PRESSURE: 84 MMHG | WEIGHT: 269 LBS | SYSTOLIC BLOOD PRESSURE: 137 MMHG | HEART RATE: 83 BPM

## 2024-06-20 DIAGNOSIS — Z48.89 POSTOPERATIVE VISIT: Primary | ICD-10-CM

## 2024-06-20 PROCEDURE — 1036F TOBACCO NON-USER: CPT | Performed by: STUDENT IN AN ORGANIZED HEALTH CARE EDUCATION/TRAINING PROGRAM

## 2024-06-20 PROCEDURE — 99024 POSTOP FOLLOW-UP VISIT: CPT | Performed by: STUDENT IN AN ORGANIZED HEALTH CARE EDUCATION/TRAINING PROGRAM

## 2024-06-20 RX ORDER — METRONIDAZOLE 7.5 MG/G
CREAM TOPICAL
COMMUNITY
Start: 2024-06-18

## 2024-06-20 ASSESSMENT — ENCOUNTER SYMPTOMS
EYES NEGATIVE: 0
CONSTITUTIONAL NEGATIVE: 0
GASTROINTESTINAL NEGATIVE: 0
ENDOCRINE NEGATIVE: 0
MUSCULOSKELETAL NEGATIVE: 0
ALLERGIC/IMMUNOLOGIC NEGATIVE: 0
CARDIOVASCULAR NEGATIVE: 0
PSYCHIATRIC NEGATIVE: 0
HEMATOLOGIC/LYMPHATIC NEGATIVE: 0
RESPIRATORY NEGATIVE: 0
NEUROLOGICAL NEGATIVE: 0

## 2024-06-20 ASSESSMENT — PAIN SCALES - GENERAL: PAINLEVEL: 0-NO PAIN

## 2024-08-05 ENCOUNTER — OFFICE VISIT (OUTPATIENT)
Dept: GYNECOLOGIC ONCOLOGY | Facility: CLINIC | Age: 34
End: 2024-08-05
Payer: COMMERCIAL

## 2024-08-05 VITALS
HEART RATE: 73 BPM | TEMPERATURE: 97.7 F | RESPIRATION RATE: 16 BRPM | SYSTOLIC BLOOD PRESSURE: 143 MMHG | DIASTOLIC BLOOD PRESSURE: 85 MMHG | OXYGEN SATURATION: 96 % | BODY MASS INDEX: 52.24 KG/M2 | WEIGHT: 276.46 LBS

## 2024-08-05 DIAGNOSIS — D25.2 INTRAMURAL, SUBMUCOUS, AND SUBSEROUS LEIOMYOMA OF UTERUS: Primary | ICD-10-CM

## 2024-08-05 DIAGNOSIS — D25.0 INTRAMURAL, SUBMUCOUS, AND SUBSEROUS LEIOMYOMA OF UTERUS: Primary | ICD-10-CM

## 2024-08-05 DIAGNOSIS — D25.1 INTRAMURAL, SUBMUCOUS, AND SUBSEROUS LEIOMYOMA OF UTERUS: Primary | ICD-10-CM

## 2024-08-05 PROCEDURE — 1036F TOBACCO NON-USER: CPT | Performed by: OBSTETRICS & GYNECOLOGY

## 2024-08-05 PROCEDURE — 99214 OFFICE O/P EST MOD 30 MIN: CPT | Performed by: OBSTETRICS & GYNECOLOGY

## 2024-08-05 ASSESSMENT — ENCOUNTER SYMPTOMS
DIFFICULTY URINATING: 0
PALPITATIONS: 0
DIARRHEA: 0
CHILLS: 0
VOMITING: 0
UNEXPECTED WEIGHT CHANGE: 0
SHORTNESS OF BREATH: 0
BLOOD IN STOOL: 0
HEMATURIA: 0
CONSTIPATION: 0
DYSURIA: 0
FREQUENCY: 0
APPETITE CHANGE: 0
ABDOMINAL PAIN: 0
FEVER: 0
NAUSEA: 0

## 2024-08-05 ASSESSMENT — PAIN SCALES - GENERAL: PAINLEVEL: 0-NO PAIN

## 2024-08-05 NOTE — PROGRESS NOTES
Patient ID: Yessica Shaw is a 34 y.o. female.  Referring Physician: No referring provider defined for this encounter.  Primary Care Provider: Brett Ferrell, JOSE-CNP      Subjective    HPI  Yessica Shaw is a 35yo female with a history of uterine fibroids s/p total robotic hysterectomy with bilateral salpingectomy (5/2024). 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 She denies complications following surgery and reports no persistent bleeding or pain. The patient had an abnormal pap test in 10/2023 that could not be biopsied due to the presence of fibroids.   The patient has no new concerns today apart from establishing care for monitoring of recent STUMP tumor diagnosis. She endorses regular bowel movements and denies dysuria, hematuria, pelvic pain, and abdominal pain.    Review of Systems   Constitutional:  Negative for appetite change, chills, fever and unexpected weight change.   Respiratory:  Negative for shortness of breath.    Cardiovascular:  Negative for palpitations.   Gastrointestinal:  Negative for abdominal pain, blood in stool, constipation, diarrhea, nausea and vomiting.   Genitourinary:  Negative for difficulty urinating, dysuria, frequency, hematuria, pelvic pain, vaginal bleeding and vaginal discharge.         Objective   BSA: There is no height or weight on file to calculate BSA.  LMP 04/16/2024      Family History   Problem Relation Name Age of Onset    Hypertension Mother      Breast cancer Mother  63    Thyroid cancer Mother      Hypertension Father      Skin cancer Father      Hypertension Brother      Breast cancer Grandmother  42    Breast cancer Great grandmother  63     Yessica reports that she completed genetic testing due to significant family history of cancer in 12/2023 and reports that no significant mutations were identified.     Khadijah Shaw  reports that she has never smoked. She has never used  smokeless tobacco.  She  reports current alcohol use.  She  reports no history of drug use.    Physical Exam  Constitutional:       Appearance: Normal appearance.   HENT:      Head: Normocephalic and atraumatic.      Nose: Nose normal.      Mouth/Throat:      Mouth: Mucous membranes are moist.   Eyes:      Extraocular Movements: Extraocular movements intact.      Conjunctiva/sclera: Conjunctivae normal.      Pupils: Pupils are equal, round, and reactive to light.   Cardiovascular:      Rate and Rhythm: Normal rate and regular rhythm.      Pulses: Normal pulses.      Heart sounds: No murmur heard.     No friction rub. No gallop.   Pulmonary:      Effort: Pulmonary effort is normal.      Breath sounds: Normal breath sounds. No stridor. No wheezing, rhonchi or rales.   Abdominal:      General: There is no distension.      Palpations: Abdomen is soft. There is no mass.      Tenderness: There is no abdominal tenderness. There is no guarding.      Hernia: No hernia is present.   Genitourinary:     General: Normal vulva.      Exam position: Lithotomy position.      Vagina: Normal.      Adnexa: Right adnexa normal and left adnexa normal.        Right: No mass.          Left: No mass.        Comments: Vaginal cuff with no lesions  No masses on bimanual exam  Musculoskeletal:         General: No swelling or deformity.      Cervical back: Normal range of motion.      Right lower leg: No edema.      Left lower leg: No edema.   Skin:     General: Skin is warm and dry.   Neurological:      General: No focal deficit present.      Mental Status: She is alert and oriented to person, place, and time.   Psychiatric:         Mood and Affect: Mood normal.         Thought Content: Thought content normal.         Performance Status:  Asymptomatic    Assessment/Plan      Yessica Shaw is a 33 yo female with a history of uterine fibroids s/p total robotic hysterectomy with bilateral salpingectomy (5/2024). 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 Considering the small size of the tumor and lack of symptoms in the patient, concern for malignancy is low. Hysterectomy is the current gold standard treatment for STUMP tumors; thus, no further intervention is needed at this time. The patient was educated regarding the potential for recurrence of this tumor and the need for continued monitoring and follow-up. Speculum and bimanual pelvic exams were performed to establish patient baseline for future monitoring, and baseline CT scan will be completed. The patient agrees to follow-up every 6 months with a plan for yearly CT scans for continued monitoring of disease.    Oncology History    No history exists.     - Obtain baseline CT scan  - Plan to follow-up every 6 months for monitoring for recurrence  - Plan to complete yearly CT scan for monitoring for recurrence     Problem List Items Addressed This Visit             ICD-10-CM    Intramural, submucous, and subserous leiomyoma of uterus - Primary D25.1, D25.0, D25.2    Relevant Orders    CT chest abdomen pelvis w IV contrast       Treatment Plans       No treatment plans exist          I, or a resident under my supervision, was present with the medical student who participated in the documentation of this note.  I have personally seen and examined the patient and performed the medical decision-making components. I have reviewed the medical student documentation and/or resident documentation and verified the findings in the note as written with additions or exceptions as stated in the body of the note.    Patient to be phone with CT results    Follow up 6 months for surveillance visits.  Tentative plan for annual CT scan for screening.

## 2024-08-16 ENCOUNTER — LAB (OUTPATIENT)
Dept: LAB | Facility: LAB | Age: 34
End: 2024-08-16
Payer: COMMERCIAL

## 2024-08-16 DIAGNOSIS — D25.0 INTRAMURAL, SUBMUCOUS, AND SUBSEROUS LEIOMYOMA OF UTERUS: ICD-10-CM

## 2024-08-16 DIAGNOSIS — D25.2 INTRAMURAL, SUBMUCOUS, AND SUBSEROUS LEIOMYOMA OF UTERUS: ICD-10-CM

## 2024-08-16 DIAGNOSIS — D25.1 INTRAMURAL, SUBMUCOUS, AND SUBSEROUS LEIOMYOMA OF UTERUS: ICD-10-CM

## 2024-08-16 LAB
CREAT SERPL-MCNC: 0.77 MG/DL (ref 0.5–1.05)
EGFRCR SERPLBLD CKD-EPI 2021: >90 ML/MIN/1.73M*2

## 2024-08-16 PROCEDURE — 82565 ASSAY OF CREATININE: CPT

## 2024-08-16 PROCEDURE — 36415 COLL VENOUS BLD VENIPUNCTURE: CPT

## 2024-08-21 ENCOUNTER — APPOINTMENT (OUTPATIENT)
Dept: RADIOLOGY | Facility: CLINIC | Age: 34
End: 2024-08-21
Payer: COMMERCIAL

## 2024-08-27 ENCOUNTER — HOSPITAL ENCOUNTER (OUTPATIENT)
Dept: RADIOLOGY | Facility: CLINIC | Age: 34
Discharge: HOME | End: 2024-08-27
Payer: COMMERCIAL

## 2024-08-27 DIAGNOSIS — D25.2 INTRAMURAL, SUBMUCOUS, AND SUBSEROUS LEIOMYOMA OF UTERUS: ICD-10-CM

## 2024-08-27 DIAGNOSIS — D25.0 INTRAMURAL, SUBMUCOUS, AND SUBSEROUS LEIOMYOMA OF UTERUS: ICD-10-CM

## 2024-08-27 DIAGNOSIS — D25.1 INTRAMURAL, SUBMUCOUS, AND SUBSEROUS LEIOMYOMA OF UTERUS: ICD-10-CM

## 2024-08-27 PROCEDURE — 2550000001 HC RX 255 CONTRASTS: Performed by: OBSTETRICS & GYNECOLOGY

## 2024-08-27 PROCEDURE — 74177 CT ABD & PELVIS W/CONTRAST: CPT

## 2024-08-29 ENCOUNTER — TELEPHONE (OUTPATIENT)
Dept: GYNECOLOGIC ONCOLOGY | Facility: HOSPITAL | Age: 34
End: 2024-08-29
Payer: COMMERCIAL

## 2024-08-29 NOTE — TELEPHONE ENCOUNTER
Phoned patient to notify her per  Dr. Diamond that CT is negative and Dr. Diamond recommends keeping appointment as scheduled in February.   Message left on patient voice mail.

## 2025-02-03 ENCOUNTER — OFFICE VISIT (OUTPATIENT)
Dept: GYNECOLOGIC ONCOLOGY | Facility: CLINIC | Age: 35
End: 2025-02-03
Payer: COMMERCIAL

## 2025-02-03 VITALS
SYSTOLIC BLOOD PRESSURE: 136 MMHG | OXYGEN SATURATION: 94 % | TEMPERATURE: 97 F | HEART RATE: 76 BPM | DIASTOLIC BLOOD PRESSURE: 81 MMHG | RESPIRATION RATE: 18 BRPM | WEIGHT: 260.8 LBS | BODY MASS INDEX: 49.28 KG/M2

## 2025-02-03 DIAGNOSIS — D25.1 INTRAMURAL, SUBMUCOUS, AND SUBSEROUS LEIOMYOMA OF UTERUS: Primary | ICD-10-CM

## 2025-02-03 DIAGNOSIS — D25.0 INTRAMURAL, SUBMUCOUS, AND SUBSEROUS LEIOMYOMA OF UTERUS: Primary | ICD-10-CM

## 2025-02-03 DIAGNOSIS — D25.2 INTRAMURAL, SUBMUCOUS, AND SUBSEROUS LEIOMYOMA OF UTERUS: Primary | ICD-10-CM

## 2025-02-03 PROCEDURE — 99214 OFFICE O/P EST MOD 30 MIN: CPT | Performed by: OBSTETRICS & GYNECOLOGY

## 2025-02-03 RX ORDER — PHENTERMINE HYDROCHLORIDE 37.5 MG/1
37.5 TABLET ORAL
COMMUNITY
Start: 2024-12-14

## 2025-02-03 ASSESSMENT — PAIN SCALES - GENERAL: PAINLEVEL_OUTOF10: 0-NO PAIN

## 2025-02-03 NOTE — PROGRESS NOTES
Patient ID: Yessica Shaw is a 34 y.o. female.  Referring Physician: No referring provider defined for this encounter.  Primary Care Provider: Brett Ferrell, JOSE-CNP    Subjective    Yessica Shaw is a 33yo female with a history of uterine fibroids s/p total robotic hysterectomy with bilateral salpingectomy (5/2024).   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 She denies complications following surgery and reports no persistent bleeding or pain.  Doing ok  Being evaluated for fatty liver - working with GI  Taking phentermine for weight loss          Objective    BSA: 2.25 meters squared  /81   Pulse 76   Temp 36.1 °C (97 °F)   Resp 18   Wt 118 kg (260 lb 12.9 oz)   LMP 04/16/2024   SpO2 94%   BMI 49.28 kg/m²      Physical Exam  Constitutional:       Appearance: Normal appearance.   Cardiovascular:      Rate and Rhythm: Normal rate.      Heart sounds: Normal heart sounds.   Pulmonary:      Effort: Pulmonary effort is normal.      Breath sounds: Normal breath sounds.   Abdominal:      General: Abdomen is flat. There is no distension.      Palpations: There is no mass.      Tenderness: There is no abdominal tenderness.   Genitourinary:     General: Normal vulva.      Exam position: Lithotomy position.      Uterus: Absent.       Comments: Cervix and uterine surgically absent.  No lesions involving the vaginal cuff.  No masses on bimanual or rectovaginal exam  Musculoskeletal:      Cervical back: Normal range of motion.   Skin:     General: Skin is warm and dry.   Neurological:      Mental Status: She is alert.   Psychiatric:         Mood and Affect: Mood normal.         Behavior: Behavior normal.         Performance Status:  Asymptomatic    Assessment/Plan     Oncology History    No history exists.        Problem List Items Addressed This Visit             ICD-10-CM    Intramural, submucous, and subserous leiomyoma of uterus - Primary D25.1, D25.0, D25.2    Relevant  Orders    CT chest abdomen pelvis w IV contrast       Treatment Plans       No treatment plans exist          Doing well.  Currently JORDON.  Plan for CT in August, follow up 6 months for cancer surveillance.

## 2025-03-13 ENCOUNTER — HOSPITAL ENCOUNTER (OUTPATIENT)
Dept: RADIOLOGY | Facility: HOSPITAL | Age: 35
Discharge: HOME | End: 2025-03-13
Payer: COMMERCIAL

## 2025-03-13 DIAGNOSIS — K76.9 LIVER DISEASE, UNSPECIFIED: ICD-10-CM

## 2025-03-13 PROCEDURE — A9575 INJ GADOTERATE MEGLUMI 0.1ML: HCPCS

## 2025-03-13 PROCEDURE — 76391 MR ELASTOGRAPHY: CPT

## 2025-03-13 PROCEDURE — 2550000001 HC RX 255 CONTRASTS

## 2025-03-13 RX ORDER — GADOTERATE MEGLUMINE 376.9 MG/ML
24 INJECTION INTRAVENOUS
Status: COMPLETED | OUTPATIENT
Start: 2025-03-13 | End: 2025-03-13

## 2025-03-13 RX ADMIN — GADOTERATE MEGLUMINE 24 ML: 376.9 INJECTION INTRAVENOUS at 08:34

## 2025-06-11 ENCOUNTER — APPOINTMENT (OUTPATIENT)
Dept: PRIMARY CARE | Facility: CLINIC | Age: 35
End: 2025-06-11
Payer: COMMERCIAL

## 2025-06-11 VITALS
WEIGHT: 264 LBS | BODY MASS INDEX: 49.84 KG/M2 | HEART RATE: 63 BPM | RESPIRATION RATE: 16 BRPM | DIASTOLIC BLOOD PRESSURE: 82 MMHG | SYSTOLIC BLOOD PRESSURE: 141 MMHG | OXYGEN SATURATION: 95 % | HEIGHT: 61 IN

## 2025-06-11 DIAGNOSIS — E66.813 CLASS 3 SEVERE OBESITY WITHOUT SERIOUS COMORBIDITY WITH BODY MASS INDEX (BMI) OF 45.0 TO 49.9 IN ADULT, UNSPECIFIED OBESITY TYPE: ICD-10-CM

## 2025-06-11 DIAGNOSIS — K76.0 NAFLD (NONALCOHOLIC FATTY LIVER DISEASE): ICD-10-CM

## 2025-06-11 DIAGNOSIS — R53.83 FATIGUE, UNSPECIFIED TYPE: ICD-10-CM

## 2025-06-11 DIAGNOSIS — R06.83 SNORING: Primary | ICD-10-CM

## 2025-06-11 PROCEDURE — 3008F BODY MASS INDEX DOCD: CPT | Performed by: NURSE PRACTITIONER

## 2025-06-11 PROCEDURE — 99214 OFFICE O/P EST MOD 30 MIN: CPT | Performed by: NURSE PRACTITIONER

## 2025-06-11 ASSESSMENT — ENCOUNTER SYMPTOMS
FATIGUE: 1
FEVER: 0
GASTROINTESTINAL NEGATIVE: 1
CARDIOVASCULAR NEGATIVE: 1
MUSCULOSKELETAL NEGATIVE: 1
ENDOCRINE NEGATIVE: 1
EYES NEGATIVE: 1
RESPIRATORY NEGATIVE: 1

## 2025-06-11 NOTE — PROGRESS NOTES
"Subjective   Khadijah Shaw \"Yessica\" is a 34 y.o. female who presents for Establish Care, Weight Loss, and Obesity.      Patient sister sees Qian,Patient first time meeting with Qian  Patient was on Phentermine has been off probably for 3-4 weeks, wants to know what could be done after this.  Has been taking milk thistle has a fatty liver.  Had hysterectomy Last Year, found a tumor as well so has to see GYN, Ovaries are still producing. After the hysterectomy gained 30 lbs. She doesn't have hot flashes but does notice some thermo  dysregulation, was on lupron for 8 weeks before her hysterectomy. She is off of everything now.     Jazz Sahw is a 34 y.o. female here for discussion regarding weight loss. She has noted a weight gain of approximately 30 pounds over the last 2 years. She feels ideal weight is under 200 pounds. Weight at graduation from high school was around 200  pounds. History of eating disorders: none. There is a family history positive for obesity in the patient, father, sister, and brother. Previous treatments for obesity include phentermine for 6 months last year . Obesity associated medical conditions: depression. Obesity associated medications: none. Cardiovascular risk factors besides obesity: obesity (BMI >= 30 kg/m2).    Objective  Body mass index is 49.88 kg/m².    Assessment/Plan  Obesity. I assessed Yessica to be in an action stage with respect to weight loss.  General weight loss/lifestyle modification strategies discussed (elicit support from others; identify saboteurs; non-food rewards, etc).  Behavioral treatment: Semaglutide which works on one hormonal pathway at the gut level. Tirzepatide is preferred as it works on two hormonal pathways at the gut level, making it a bit more effective! It is still a once weekly injection.  Tirzepatide is FDA approved for the treatment of Type 2 Diabetes by brand name Mounjaro.  Tirzepatide is FDA approved for the " treatment of Obesity by brand name, Zepbound.     Tirzepatide is a glucose-dependent insulinotropic polypeptide (GIP) receptor and glucagon-like peptide-1 (GLP-1) receptor agonist indicated as an adjunct to diet and exercise to improve glycemic control or assist with weight management.     IMPORTANT Lifestyle Management with the use of anti-obesity medication include:  - Monitor/track your protein intake, ensure at least 80-90g of protein per day for women, 110-120g for men -> this helps to ensure you are consuming adequate protein to maintain/preserve lean body mass in weight loss setting.  - Follow a controlled carbohydrate diet.  Tirzepatide can increase the release of insulin in response to a diet that is high in carbohydrates.  Elevated insulin levels may contribute to weight gain and appetite dysfunction.  A lower carbohydrate diet approach which emphasizes whole food, high fiber carbohydrates is recommended with use.  Specific carbohydrate recommendations can be reviewed with your provider or clinical dietitian.   - Consume at least 64 oz of water per day -> this helps to reduce the risk of constipation/dehydration associated with this medication  - Engage in resistance at least 2x/week - targeting upper & lower body group with each resistance training session -> this helps to ensure you maintain/preserve lean body mass in weight loss setting.     DOSAGE AND ADMINISTRATION:   - Administer Tirzepatide once weekly, on the same day each week, at any time of day, with or without meals.   - Inject subcutaneously in the abdomen, thigh or upper arm.  - The recommended starting dosage is 2.5 mg injected subcutaneously once weekly x 4 weeks, further dose increases are determined based on your clinical response of glycemic control or weight reduction, depending on indication for use.  - The maximum dosage is 15 mg subcutaneously once weekly.     CONTRAINDICATIONS  - Personal or family history of medullary thyroid  carcinoma or in patients with Multiple Endocrine Neoplasia syndrome type 2.  - Personal history of pancreatitis     WARNINGS AND PRECAUTIONS   Women of childbearing age: Tirzepatide can impact the absorption of oral contraceptives, back up contraception is encouraged or discussing with your OBGYN or primary care alternative methods of birth control such as IUD or Implant is advised.   - Pancreatitis: Has been reported in clinical trials. Discontinue promptly if pancreatitis is suspected.   - Hypoglycemia with Use of Insulin Secretagogues (Medications that increase your body's production of insulin like Glipizide, Glimepiride, etc) or Insulin: Use of these medications with Tirzepatide may increase the risk of hypoglycemia.  Reducing dose of insulin secretagogue or insulin may be necessary.    - Hypersensitivity Reactions: Hypersensitivity reactions have been reported. Discontinue Tirzepatide if suspected.   - Acute Kidney Injury: Was reported in setting of dehydration due to decreased oral intake of fluids for patients on tirzepatide, monitoring of kidney function will be part of your ongoing care, also severe adverse gastrointestinal reactions are associated with acute kidney injury.     - Severe Gastrointestinal Disease: Use may be associated with gastrointestinal adverse reactions, sometimes severe. Has not been studied in patients with severe gastrointestinal disease and is not recommended in these patients.   - Diabetic Retinopathy Complications in patients with a History of Diabetic Retinopathy:   - Acute Gallbladder Disease: Has occurred in clinical trials. If cholelithiasis is suspected, gallbladder studies and clinical follow-up are indicated.      ADVERSE REACTIONS  - The most common adverse reactions reported in patients treated with Tirzepatide are: nausea, diarrhea, decreased appetite, vomiting, constipation, dyspepsia, and abdominal pain.      *For Constipation--> take a fiber supplement or stool  "softener daily. Make sure you are drinking at least 64oz of water daily.  *For Nausea--> Eat small, high protein, meals spaced at scheduled intervals throughout the day.   If nausea persists, please call the office.    .  Regular aerobic exercise program discussed.          Review of Systems   Constitutional:  Positive for fatigue. Negative for fever.   HENT: Negative.     Eyes: Negative.    Respiratory: Negative.     Cardiovascular: Negative.    Gastrointestinal: Negative.    Endocrine: Negative.    Genitourinary: Negative.    Musculoskeletal: Negative.    Skin: Negative.    All other systems reviewed and are negative.      Objective   /82 (BP Location: Left arm, Patient Position: Sitting, BP Cuff Size: Adult)   Pulse 63   Resp 16   Ht 1.549 m (5' 1\")   Wt 120 kg (264 lb)   LMP 04/16/2024   SpO2 95%   BMI 49.88 kg/m²       Physical Exam  Vitals reviewed.   HENT:      Head: Normocephalic.   Cardiovascular:      Rate and Rhythm: Normal rate and regular rhythm.      Pulses: Normal pulses.      Heart sounds: Normal heart sounds. No murmur heard.     No friction rub. No gallop.   Pulmonary:      Effort: Pulmonary effort is normal. No respiratory distress.      Breath sounds: Normal breath sounds. No stridor. No wheezing, rhonchi or rales.   Chest:      Chest wall: No tenderness.   Neurological:      General: No focal deficit present.      Mental Status: She is alert and oriented to person, place, and time. Mental status is at baseline.   Psychiatric:         Mood and Affect: Mood normal.         Behavior: Behavior normal.         Thought Content: Thought content normal.         Judgment: Judgment normal.         Assessment & Plan  Snoring    Orders:    Home sleep apnea test (HSAT); Future    Fatigue, unspecified type    Orders:    Home sleep apnea test (HSAT); Future    TSH with reflex to Free T4 if abnormal; Future    Lipid Panel; Future    Hemoglobin A1C; Future    CBC; Future    Hepatic Function Panel; " Future    Cortisol AM; Future    [facility] INS[facility]; Future    Class 3 severe obesity without serious comorbidity with body mass index (BMI) of 45.0 to 49.9 in adult, unspecified obesity type    Orders:    tirzepatide, weight loss, (Zepbound) 2.5 mg/0.5 mL injection; Inject 2.5 mg under the skin every 7 days.    TSH with reflex to Free T4 if abnormal; Future    Lipid Panel; Future    Hemoglobin A1C; Future    CBC; Future    Hepatic Function Panel; Future    Cortisol AM; Future    [facility] INS[facility]IN; Future    NAFLD (nonalcoholic fatty liver disease)    Orders:    tirzepatide, weight loss, (Zepbound) 2.5 mg/0.5 mL injection; Inject 2.5 mg under the skin every 7 days.    TSH with reflex to Free T4 if abnormal; Future    Lipid Panel; Future    Hemoglobin A1C; Future    CBC; Future    Hepatic Function Panel; Future    Cortisol AM; Future    [facility] INS[facility]IN; Future

## 2025-06-21 ENCOUNTER — PROCEDURE VISIT (OUTPATIENT)
Dept: SLEEP MEDICINE | Facility: HOSPITAL | Age: 35
End: 2025-06-21
Payer: COMMERCIAL

## 2025-06-21 DIAGNOSIS — R53.83 FATIGUE, UNSPECIFIED TYPE: ICD-10-CM

## 2025-06-21 DIAGNOSIS — R06.83 SNORING: ICD-10-CM

## 2025-06-21 PROCEDURE — 95806 SLEEP STUDY UNATT&RESP EFFT: CPT | Performed by: PSYCHIATRY & NEUROLOGY

## 2025-06-30 DIAGNOSIS — G47.30 SLEEP APNEA IN ADULT: ICD-10-CM

## 2025-08-03 LAB
ALBUMIN SERPL-MCNC: 3.8 G/DL (ref 3.6–5.1)
ALBUMIN/GLOB SERPL: 1.2 (CALC) (ref 1–2.5)
ALP SERPL-CCNC: 94 U/L (ref 31–125)
ALT SERPL-CCNC: 23 U/L (ref 6–29)
AST SERPL-CCNC: 21 U/L (ref 10–30)
BILIRUB DIRECT SERPL-MCNC: 0.1 MG/DL
BILIRUB INDIRECT SERPL-MCNC: 0.2 MG/DL (CALC) (ref 0.2–1.2)
BILIRUB SERPL-MCNC: 0.3 MG/DL (ref 0.2–1.2)
CHOLEST SERPL-MCNC: 152 MG/DL
CHOLEST/HDLC SERPL: 3.2 (CALC)
CORTIS AM PEAK SERPL-MCNC: 4.5 MCG/DL
ERYTHROCYTE [DISTWIDTH] IN BLOOD BY AUTOMATED COUNT: 12.8 % (ref 11–15)
EST. AVERAGE GLUCOSE BLD GHB EST-MCNC: 114 MG/DL
EST. AVERAGE GLUCOSE BLD GHB EST-SCNC: 6.3 MMOL/L
GLOBULIN SER CALC-MCNC: 3.3 G/DL (CALC) (ref 1.9–3.7)
HBA1C MFR BLD: 5.6 %
HCT VFR BLD AUTO: 38.1 % (ref 35–45)
HDLC SERPL-MCNC: 48 MG/DL
HGB BLD-MCNC: 12.3 G/DL (ref 11.7–15.5)
INSULIN SERPL-ACNC: NORMAL U[IU]/ML
LDLC SERPL CALC-MCNC: 82 MG/DL (CALC)
MCH RBC QN AUTO: 28.3 PG (ref 27–33)
MCHC RBC AUTO-ENTMCNC: 32.3 G/DL (ref 32–36)
MCV RBC AUTO: 87.6 FL (ref 80–100)
NONHDLC SERPL-MCNC: 104 MG/DL (CALC)
PLATELET # BLD AUTO: 299 THOUSAND/UL (ref 140–400)
PMV BLD REES-ECKER: 11 FL (ref 7.5–12.5)
PROT SERPL-MCNC: 7.1 G/DL (ref 6.1–8.1)
RBC # BLD AUTO: 4.35 MILLION/UL (ref 3.8–5.1)
TRIGL SERPL-MCNC: 123 MG/DL
TSH SERPL-ACNC: 1.56 MIU/L
WBC # BLD AUTO: 10.8 THOUSAND/UL (ref 3.8–10.8)

## 2025-08-04 LAB
ALBUMIN SERPL-MCNC: 3.8 G/DL (ref 3.6–5.1)
ALBUMIN/GLOB SERPL: 1.2 (CALC) (ref 1–2.5)
ALP SERPL-CCNC: 94 U/L (ref 31–125)
ALT SERPL-CCNC: 23 U/L (ref 6–29)
AST SERPL-CCNC: 21 U/L (ref 10–30)
BILIRUB DIRECT SERPL-MCNC: 0.1 MG/DL
BILIRUB INDIRECT SERPL-MCNC: 0.2 MG/DL (CALC) (ref 0.2–1.2)
BILIRUB SERPL-MCNC: 0.3 MG/DL (ref 0.2–1.2)
CHOLEST SERPL-MCNC: 152 MG/DL
CHOLEST/HDLC SERPL: 3.2 (CALC)
CORTIS AM PEAK SERPL-MCNC: 4.5 MCG/DL
ERYTHROCYTE [DISTWIDTH] IN BLOOD BY AUTOMATED COUNT: 12.8 % (ref 11–15)
EST. AVERAGE GLUCOSE BLD GHB EST-MCNC: 114 MG/DL
EST. AVERAGE GLUCOSE BLD GHB EST-SCNC: 6.3 MMOL/L
GLOBULIN SER CALC-MCNC: 3.3 G/DL (CALC) (ref 1.9–3.7)
HBA1C MFR BLD: 5.6 %
HCT VFR BLD AUTO: 38.1 % (ref 35–45)
HDLC SERPL-MCNC: 48 MG/DL
HGB BLD-MCNC: 12.3 G/DL (ref 11.7–15.5)
INSULIN SERPL-ACNC: 22.3 UIU/ML
LDLC SERPL CALC-MCNC: 82 MG/DL (CALC)
MCH RBC QN AUTO: 28.3 PG (ref 27–33)
MCHC RBC AUTO-ENTMCNC: 32.3 G/DL (ref 32–36)
MCV RBC AUTO: 87.6 FL (ref 80–100)
NONHDLC SERPL-MCNC: 104 MG/DL (CALC)
PLATELET # BLD AUTO: 299 THOUSAND/UL (ref 140–400)
PMV BLD REES-ECKER: 11 FL (ref 7.5–12.5)
PROT SERPL-MCNC: 7.1 G/DL (ref 6.1–8.1)
RBC # BLD AUTO: 4.35 MILLION/UL (ref 3.8–5.1)
TRIGL SERPL-MCNC: 123 MG/DL
TSH SERPL-ACNC: 1.56 MIU/L
WBC # BLD AUTO: 10.8 THOUSAND/UL (ref 3.8–10.8)

## 2025-08-11 ENCOUNTER — APPOINTMENT (OUTPATIENT)
Dept: RADIOLOGY | Facility: CLINIC | Age: 35
End: 2025-08-11
Payer: COMMERCIAL

## 2025-08-13 ENCOUNTER — HOSPITAL ENCOUNTER (OUTPATIENT)
Dept: RADIOLOGY | Facility: CLINIC | Age: 35
Discharge: HOME | End: 2025-08-13
Payer: COMMERCIAL

## 2025-08-13 DIAGNOSIS — D25.2 INTRAMURAL, SUBMUCOUS, AND SUBSEROUS LEIOMYOMA OF UTERUS: ICD-10-CM

## 2025-08-13 DIAGNOSIS — D25.1 INTRAMURAL, SUBMUCOUS, AND SUBSEROUS LEIOMYOMA OF UTERUS: ICD-10-CM

## 2025-08-13 DIAGNOSIS — D25.0 INTRAMURAL, SUBMUCOUS, AND SUBSEROUS LEIOMYOMA OF UTERUS: ICD-10-CM

## 2025-08-13 PROCEDURE — 2550000001 HC RX 255 CONTRASTS: Performed by: OBSTETRICS & GYNECOLOGY

## 2025-08-13 PROCEDURE — 74177 CT ABD & PELVIS W/CONTRAST: CPT | Performed by: RADIOLOGY

## 2025-08-13 PROCEDURE — 71260 CT THORAX DX C+: CPT | Performed by: RADIOLOGY

## 2025-08-13 PROCEDURE — 74177 CT ABD & PELVIS W/CONTRAST: CPT

## 2025-08-13 RX ADMIN — IOHEXOL 90 ML: 350 INJECTION, SOLUTION INTRAVENOUS at 09:50

## 2025-08-18 ENCOUNTER — APPOINTMENT (OUTPATIENT)
Dept: GYNECOLOGIC ONCOLOGY | Facility: CLINIC | Age: 35
End: 2025-08-18
Payer: COMMERCIAL

## 2025-08-27 ENCOUNTER — APPOINTMENT (OUTPATIENT)
Dept: PRIMARY CARE | Facility: CLINIC | Age: 35
End: 2025-08-27
Payer: COMMERCIAL

## 2025-08-27 VITALS
TEMPERATURE: 98.6 F | HEIGHT: 61 IN | RESPIRATION RATE: 16 BRPM | SYSTOLIC BLOOD PRESSURE: 131 MMHG | HEART RATE: 71 BPM | DIASTOLIC BLOOD PRESSURE: 81 MMHG | OXYGEN SATURATION: 95 % | WEIGHT: 276.4 LBS | BODY MASS INDEX: 52.18 KG/M2

## 2025-08-27 DIAGNOSIS — N75.0 BARTHOLIN GLAND CYST: ICD-10-CM

## 2025-08-27 DIAGNOSIS — Z80.3 FAMILY HISTORY OF BREAST CANCER: ICD-10-CM

## 2025-08-27 DIAGNOSIS — Z90.710 STATUS POST HYSTERECTOMY: ICD-10-CM

## 2025-08-27 DIAGNOSIS — G47.33 OBSTRUCTIVE SLEEP APNEA: ICD-10-CM

## 2025-08-27 DIAGNOSIS — K76.0 NAFLD (NONALCOHOLIC FATTY LIVER DISEASE): ICD-10-CM

## 2025-08-27 DIAGNOSIS — E66.813 CLASS 3 SEVERE OBESITY WITHOUT SERIOUS COMORBIDITY WITH BODY MASS INDEX (BMI) OF 45.0 TO 49.9 IN ADULT, UNSPECIFIED OBESITY TYPE: Primary | ICD-10-CM

## 2025-08-27 PROCEDURE — 99214 OFFICE O/P EST MOD 30 MIN: CPT | Performed by: NURSE PRACTITIONER

## 2025-08-27 PROCEDURE — 3008F BODY MASS INDEX DOCD: CPT | Performed by: NURSE PRACTITIONER

## 2025-08-27 RX ORDER — MUPIROCIN 20 MG/G
OINTMENT TOPICAL
Qty: 30 G | Refills: 0 | Status: SHIPPED | OUTPATIENT
Start: 2025-08-27 | End: 2025-09-06

## 2025-08-27 RX ORDER — SEMAGLUTIDE 0.25 MG/.5ML
0.25 INJECTION, SOLUTION SUBCUTANEOUS WEEKLY
Qty: 2 ML | Refills: 0 | Status: SHIPPED | OUTPATIENT
Start: 2025-08-27 | End: 2025-09-18

## 2025-08-27 ASSESSMENT — ENCOUNTER SYMPTOMS
PALPITATIONS: 0
MUSCULOSKELETAL NEGATIVE: 1
CARDIOVASCULAR NEGATIVE: 1
CONSTITUTIONAL NEGATIVE: 1
RESPIRATORY NEGATIVE: 1
GASTROINTESTINAL NEGATIVE: 1

## 2025-08-27 ASSESSMENT — PATIENT HEALTH QUESTIONNAIRE - PHQ9
SUM OF ALL RESPONSES TO PHQ9 QUESTIONS 1 AND 2: 0
2. FEELING DOWN, DEPRESSED OR HOPELESS: NOT AT ALL
1. LITTLE INTEREST OR PLEASURE IN DOING THINGS: NOT AT ALL

## 2025-09-03 ENCOUNTER — APPOINTMENT (OUTPATIENT)
Dept: PHARMACY | Facility: HOSPITAL | Age: 35
End: 2025-09-03
Payer: COMMERCIAL

## 2025-09-17 ENCOUNTER — APPOINTMENT (OUTPATIENT)
Dept: PRIMARY CARE | Facility: CLINIC | Age: 35
End: 2025-09-17
Payer: COMMERCIAL

## 2025-11-19 ENCOUNTER — APPOINTMENT (OUTPATIENT)
Dept: PRIMARY CARE | Facility: CLINIC | Age: 35
End: 2025-11-19
Payer: COMMERCIAL

## (undated) DEVICE — DRAPE, COLUMN, DAVINCI XI

## (undated) DEVICE — DRAPE, LEGGINGS, 48 X 31 IN, STERILE, LF

## (undated) DEVICE — DRAPE PACK, LAVH, W/ATTACHED LEGGINGS, W/POUCH, 100 X 114 IN, LF, STERILE

## (undated) DEVICE — GOWN, SURGICAL, SMARTGOWN, XLARGE, STERILE

## (undated) DEVICE — ACCESSORY, AVETA, WASTE MANAGEMENT WITH CAP

## (undated) DEVICE — APPLICATOR, ENDOSCOPIC, F/SURGICEL POWDER

## (undated) DEVICE — SEAL, UNIVERSAL 5-8MM  XI

## (undated) DEVICE — BASIN SET, D & C

## (undated) DEVICE — GLOVE, SURGICAL, PROTEXIS,  6.0, PF, LATEX

## (undated) DEVICE — DRAPE, UNDERBUTTOCKS

## (undated) DEVICE — NEEDLE, SPINAL, 22 G X 3.5 IN, BLACK HUB

## (undated) DEVICE — DRAPE, ARM XI

## (undated) DEVICE — SUTURE, VICRYL, 0, 27 IN, UR-6, VIOLET

## (undated) DEVICE — MANIFOLD, 4 PORT NEPTUNE STANDARD

## (undated) DEVICE — DEVICE, RESECTING, SMOL,  WITH HANDSET AVETA, 2.9MM

## (undated) DEVICE — DRESSING, NON-ADHERENT, TELFA, 3 X 8 IN, NS

## (undated) DEVICE — COVER, TIP HOT SHEARS ENDOWRIST

## (undated) DEVICE — REST, HEAD, BAGEL, 9 IN

## (undated) DEVICE — ACCESS PORT, 8M M, LOW PROFILE W/BLADELESS OPTICAL TIP, 100MM LENGTH

## (undated) DEVICE — SCISSORS, MONOPOLAR, CURVED, 8MM

## (undated) DEVICE — IRRIGATION SET, CYSTOSCOPY, F/CONSTANT/INTERMITTENT, 8 GTT/CC, 77 IN

## (undated) DEVICE — TOWELS 4-PK

## (undated) DEVICE — Device

## (undated) DEVICE — FORCEPS, BIPOLAR FENESTRATED XI

## (undated) DEVICE — SYRINGE, 60 CC, IRRIGATION, BULB, CONTRO-BULB, PAPER POUCH

## (undated) DEVICE — PREP TRAY, SKIN, DRY, W/GLOVES

## (undated) DEVICE — GOWN, ASTOUND, L

## (undated) DEVICE — DRIVER, NEEDLE, MEGA SUTURE CUT, DAVINCI XI

## (undated) DEVICE — PROTECTOR, NERVE, ULNAR, PINK

## (undated) DEVICE — TROCAR SYSTEM, BALLOON, KII GELPORT, 12 X 100MM

## (undated) DEVICE — ADHESIVE, SKIN, MASTISOL, 2/3 CC VIAL

## (undated) DEVICE — CATHETER, URETHRAL, FOLEY, 2 WAY, 16 FR, 5 CC, SILICONE

## (undated) DEVICE — SUTURE, MONOCRYL PLUS, 4-0, PS-2 UD 27IN

## (undated) DEVICE — COVER, CART, 45 X 27 X 48 IN, CLEAR

## (undated) DEVICE — RETRACTOR, CERVICAL CUP, VCARE, STANDARD

## (undated) DEVICE — SYRINGE, LUER LOCK, 12ML

## (undated) DEVICE — SUTURE, MONOCRYL, 4-0, 18 IN, PS2, UNDYED

## (undated) DEVICE — STRIP, SKIN CLOSURE, STERI STRIP, REINFORCED, 0.5 X 4 IN

## (undated) DEVICE — OBTURATOR, BLADELESS , SU

## (undated) DEVICE — DRAPE, PAD, PREP, W/ 9 IN CUFF, 24 X 41, LF, NS

## (undated) DEVICE — OBTURATOR, BLADELESS  8MM

## (undated) DEVICE — PUMP, STRYKERFLOW 2 & HANDPIECE W/10FT. IRRIGATION TUBING

## (undated) DEVICE — KIT, ROBOTIC, CUSTOM UHC

## (undated) DEVICE — HEMOSTAT, SURGICEL POWDER 3.0GRAMS

## (undated) DEVICE — POSITIONING, THE PINK PAD, PIGAZZI SYSTEM

## (undated) DEVICE — SYSTEM, FIOS FIRST ENTRY, 12 X 100MM, KII ADVANCED FIXATION

## (undated) DEVICE — DRESSING, ADHESIVE, ISLAND, TELFA, 2 X 3.75 IN, LF

## (undated) DEVICE — TOWEL, SURGICAL, NEURO, O/R, 16 X 26, BLUE, STERILE

## (undated) DEVICE — COUNTER, NEEDLE, FOAM BLOCK, W/MAGNET, W/BLADE GUARD, 10 COUNT, RED, LF

## (undated) DEVICE — TUBING SET, TRI-LUMEN, FILTERED, F/AIRSEAL

## (undated) DEVICE — SUTURE, V-LOC, 0, 12IN, GS-21, GR 180 ABS

## (undated) DEVICE — OCCLUDER, COLPO-PNEUMO

## (undated) DEVICE — APPLICATOR, ENDOSCOPIC FLOSEAL

## (undated) DEVICE — SEALER, VESSEL, EXTENDED

## (undated) DEVICE — SYRINGE, 60 CC, LUER LOCK, MONOJECT

## (undated) DEVICE — BAG, DRAIN METER, URINE, 350CC, LF